# Patient Record
Sex: FEMALE | Race: BLACK OR AFRICAN AMERICAN | NOT HISPANIC OR LATINO | Employment: UNEMPLOYED | URBAN - METROPOLITAN AREA
[De-identification: names, ages, dates, MRNs, and addresses within clinical notes are randomized per-mention and may not be internally consistent; named-entity substitution may affect disease eponyms.]

---

## 2018-07-02 ENCOUNTER — OFFICE VISIT (OUTPATIENT)
Dept: INTERNAL MEDICINE | Facility: MEDICAL CENTER | Age: 28
End: 2018-07-02
Payer: MEDICAID

## 2018-07-02 VITALS
HEIGHT: 66 IN | BODY MASS INDEX: 20.49 KG/M2 | WEIGHT: 127.5 LBS | TEMPERATURE: 98.1 F | HEART RATE: 89 BPM | OXYGEN SATURATION: 98 % | DIASTOLIC BLOOD PRESSURE: 64 MMHG | SYSTOLIC BLOOD PRESSURE: 92 MMHG

## 2018-07-02 DIAGNOSIS — Z34.92 PRENATAL CARE IN SECOND TRIMESTER: ICD-10-CM

## 2018-07-02 DIAGNOSIS — O23.42 UTI (URINARY TRACT INFECTION) DURING PREGNANCY, SECOND TRIMESTER: ICD-10-CM

## 2018-07-02 LAB
APPEARANCE UR: NORMAL
BILIRUB UR STRIP-MCNC: NORMAL MG/DL
COLOR UR AUTO: NORMAL
GLUCOSE UR STRIP.AUTO-MCNC: NEGATIVE MG/DL
KETONES UR STRIP.AUTO-MCNC: NORMAL MG/DL
LEUKOCYTE ESTERASE UR QL STRIP.AUTO: NEGATIVE
NITRITE UR QL STRIP.AUTO: NEGATIVE
PH UR STRIP.AUTO: 6 [PH] (ref 5–8)
PROT UR QL STRIP: NEGATIVE MG/DL
RBC UR QL AUTO: NEGATIVE
SP GR UR STRIP.AUTO: 1.02
UROBILINOGEN UR STRIP-MCNC: NEGATIVE MG/DL

## 2018-07-02 PROCEDURE — 81002 URINALYSIS NONAUTO W/O SCOPE: CPT | Performed by: INTERNAL MEDICINE

## 2018-07-02 PROCEDURE — 99214 OFFICE O/P EST MOD 30 MIN: CPT | Mod: GC | Performed by: INTERNAL MEDICINE

## 2018-07-02 PROCEDURE — 81002 URINALYSIS NONAUTO W/O SCOPE: CPT | Mod: GC | Performed by: INTERNAL MEDICINE

## 2018-07-02 ASSESSMENT — PATIENT HEALTH QUESTIONNAIRE - PHQ9: CLINICAL INTERPRETATION OF PHQ2 SCORE: 0

## 2018-07-02 NOTE — LETTER
[facility]  Morgan Olson M.D.  [address]  [place] [address]  Fax: [phone]   Authorization for Release/Disclosure of   Protected Health Information   Name: KALLI QUIGLEY : 1990 SSN: [id number]   Address: [address] [place] [address] [address]  [place] [address] Phone:    [phone] (home)    I authorize the entity listed below to release/disclose the PHI below to:   [facility]/Morgan Olson M.D. and Morgan Olson M.D.   Provider or Entity Name:[address], [place], [place]   Phone:      Fax:     Reason for request: continuity of care   Information to be released:    [  ] LAST COLONOSCOPY,  including any PATH REPORT and follow-up  [  ] LAST FIT/COLOGUARD RESULT [  ] LAST DEXA  [  ] LAST MAMMOGRAM  [  ] LAST PAP  [  ] LAST LABS [  ] RETINA EXAM REPORT  [ X ] IMMUNIZATION RECORDS  [ X ] Release all info      [  ] Check here and initial the line next to each item to release ALL health information INCLUDING  _____ Care and treatment for drug and / or alcohol abuse  _____ HIV testing, infection status, or AIDS  _____ Genetic Testing    DATES OF SERVICE OR TIME PERIOD TO BE DISCLOSED: _____________  I understand and acknowledge that:  * This Authorization may be revoked at any time by you in writing, except if your health information has already been used or disclosed.  * Your health information that will be used or disclosed as a result of you signing this authorization could be re-disclosed by the recipient. If this occurs, your re-disclosed health information may no longer be protected by State or Federal laws.  * You may refuse to sign this Authorization. Your refusal will not affect your ability to obtain treatment.  * This Authorization becomes effective upon signing and will  on (date) __________.      If no date is indicated, this Authorization will  one (1) year from the signature date.    Name: Kalli  Анна    Signature:   Date:     7/2/2018       PLEASE FAX REQUESTED RECORDS BACK TO: [phone]

## 2018-07-02 NOTE — PROGRESS NOTES
New Patient to Establish    Reason to establish: New patient to establish    CC: Prenatal check up, OB/GYN referral      HPI: 27 yo female came in for prenatal check up and referral to OB/GYN. Pt's LMP was on 2/13/2018. She reports taking over the counter medication, AZO for dysuria about a week ago. Pt saw an OB on 4/10/2018 who confirmed the pregnancy. Pt has not had any lab work or prenatal care. She was moving from [facility]    Patient Active Problem List    Diagnosis Date Noted   • Labor and delivery indication for care or intervention 05/17/2013   • Abnormal Pap smear, low grade squamous intraepithelial lesion (LGSIL) 12/11/2012   • GBS (group B streptococcus) UTI complicating pregnancy 12/03/2012   • Supervision of normal pregnancy 11/27/2012       Past Medical History:   Diagnosis Date   • Abnormal Pap smear, low grade squamous intraepithelial lesion (LGSIL) 12/11/2012       Current Outpatient Prescriptions   Medication Sig Dispense Refill   • Prenatal Vit-Fe Fumarate-FA (PRENATAL PO) Take 1 Tab by mouth every day.     • ondansetron (ZOFRAN ODT) 4 MG TABLET DISPERSIBLE Take 1 Tab by mouth every 8 hours as needed. (Patient not taking: Reported on 7/2/2018) 20 Tab 0   • metronidazole (METROGEL) 0.75 % gel Apply one daily for five days (Patient not taking: Reported on 7/2/2018) 5 Tube 1   • omeprazole (PRILOSEC) 40 MG delayed-release capsule Take 1 Cap by mouth every day. (Patient not taking: Reported on 7/2/2018) 30 Cap 0     No current facility-administered medications for this visit.        Allergies as of 07/02/2018   • (No Known Allergies)       Social History     Social History   • Marital status: Single     Spouse name: N/A   • Number of children: N/A   • Years of education: N/A     Occupational History   • Not on file.     Social History Main Topics   • Smoking status: Never Smoker   • Smokeless tobacco: Never Used   • Alcohol use No      Comment: last used 9/12 due to pregnancy    • Drug use: No  "  • Sexual activity: Yes     Partners: Male      Comment: none     Other Topics Concern   • Not on file     Social History Narrative   • No narrative on file       Family History   Problem Relation Age of Onset   • Hypertension Mother    • Heart Disease Maternal Grandmother    • Alcohol/Drug Maternal Grandfather    • Diabetes Paternal Grandmother        No past surgical history on file.    ROS: As per HPI. Additional pertinent symptoms as noted below.      BP (!) 92/64   Pulse 89   Temp 36.7 °C (98.1 °F)   Ht 1.683 m (5' 6.25\")   Wt 57.8 kg (127 lb 8 oz)   LMP 08/16/2012 Comment: sure  SpO2 98%   BMI 20.42 kg/m²     Physical Exam  General:  Alert and oriented, No apparent distress.    Eyes: Pupils equal and reactive. No scleral icterus.    Throat: Clear no erythema or exudates noted.    Neck: Supple. No lymphadenopathy noted. Thyroid not enlarged.    Lungs: Clear to auscultation and percussion bilaterally.    Cardiovascular: Regular rate and rhythm. No murmurs, rubs or gallops.    Abdomen:  Benign. No rebound or guarding noted.    Extremities: No clubbing, cyanosis, edema.    Skin: Clear. No rash or suspicious skin lesions noted.        Note: I have reviewed all pertinent labs and diagnostic tests associated with this visit with specific comments listed under the assessment and plan below    Assessment and Plan    1. UTI (urinary tract infection) during pregnancy, second trimester  -Dysuria about a week ago. Pt took over the counter AZO  -She has no symptoms,Urinalysis was negative in office.      2. Prenatal care in second trimester  -referred to OB/GYN    3. Hypotension  -BP laying down as 98 systolic and 68 diastolic, standing up 100 systolic and 67 diastolic  -She is asymptomatic.Advised pt to drink more water, and to inform us if she has any symptoms.       Followup: No Follow-up on file.      Signed by: Morgan Olson M.D.  "

## 2018-10-30 LAB — STREP GP B DNA PCR: NEGATIVE

## 2018-11-18 LAB — STREP GP B DNA PCR: NEGATIVE

## 2018-11-26 ENCOUNTER — HOSPITAL ENCOUNTER (OUTPATIENT)
Facility: MEDICAL CENTER | Age: 28
End: 2018-11-26
Attending: SPECIALIST | Admitting: SPECIALIST
Payer: MEDICAID

## 2018-11-26 ENCOUNTER — APPOINTMENT (OUTPATIENT)
Dept: OBGYN | Facility: MEDICAL CENTER | Age: 28
End: 2018-11-26
Attending: SPECIALIST
Payer: MEDICAID

## 2018-11-26 VITALS
DIASTOLIC BLOOD PRESSURE: 75 MMHG | TEMPERATURE: 97.2 F | WEIGHT: 159 LBS | RESPIRATION RATE: 18 BRPM | HEART RATE: 95 BPM | SYSTOLIC BLOOD PRESSURE: 114 MMHG | HEIGHT: 66 IN | BODY MASS INDEX: 25.55 KG/M2

## 2018-11-26 PROCEDURE — 59025 FETAL NON-STRESS TEST: CPT

## 2018-11-26 PROCEDURE — 700101 HCHG RX REV CODE 250: Performed by: SPECIALIST

## 2018-11-26 RX ADMIN — DINOPROSTONE 5 MG: 20 SUPPOSITORY VAGINAL at 22:31

## 2018-11-27 ENCOUNTER — APPOINTMENT (OUTPATIENT)
Dept: OBGYN | Facility: MEDICAL CENTER | Age: 28
End: 2018-11-27
Attending: SPECIALIST
Payer: MEDICAID

## 2018-11-27 ENCOUNTER — HOSPITAL ENCOUNTER (INPATIENT)
Facility: MEDICAL CENTER | Age: 28
LOS: 2 days | End: 2018-11-29
Attending: SPECIALIST | Admitting: SPECIALIST
Payer: MEDICAID

## 2018-11-27 DIAGNOSIS — R10.2 PELVIC PAIN: Primary | ICD-10-CM

## 2018-11-27 LAB
BASOPHILS # BLD AUTO: 0.3 % (ref 0–1.8)
BASOPHILS # BLD: 0.02 K/UL (ref 0–0.12)
EOSINOPHIL # BLD AUTO: 0.04 K/UL (ref 0–0.51)
EOSINOPHIL NFR BLD: 0.5 % (ref 0–6.9)
ERYTHROCYTE [DISTWIDTH] IN BLOOD BY AUTOMATED COUNT: 44.9 FL (ref 35.9–50)
HCT VFR BLD AUTO: 40.4 % (ref 37–47)
HGB BLD-MCNC: 13.2 G/DL (ref 12–16)
HOLDING TUBE BB 8507: NORMAL
IMM GRANULOCYTES # BLD AUTO: 0.08 K/UL (ref 0–0.11)
IMM GRANULOCYTES NFR BLD AUTO: 1.1 % (ref 0–0.9)
LYMPHOCYTES # BLD AUTO: 1.18 K/UL (ref 1–4.8)
LYMPHOCYTES NFR BLD: 15.9 % (ref 22–41)
MCH RBC QN AUTO: 28.7 PG (ref 27–33)
MCHC RBC AUTO-ENTMCNC: 32.7 G/DL (ref 33.6–35)
MCV RBC AUTO: 87.8 FL (ref 81.4–97.8)
MONOCYTES # BLD AUTO: 0.65 K/UL (ref 0–0.85)
MONOCYTES NFR BLD AUTO: 8.7 % (ref 0–13.4)
NEUTROPHILS # BLD AUTO: 5.46 K/UL (ref 2–7.15)
NEUTROPHILS NFR BLD: 73.5 % (ref 44–72)
NRBC # BLD AUTO: 0 K/UL
NRBC BLD-RTO: 0 /100 WBC
PLATELET # BLD AUTO: 174 K/UL (ref 164–446)
PMV BLD AUTO: 11.1 FL (ref 9–12.9)
RBC # BLD AUTO: 4.6 M/UL (ref 4.2–5.4)
RUBV IGM SER IA-ACNC: NORMAL
WBC # BLD AUTO: 7.4 K/UL (ref 4.8–10.8)

## 2018-11-27 PROCEDURE — 59409 OBSTETRICAL CARE: CPT

## 2018-11-27 PROCEDURE — 36415 COLL VENOUS BLD VENIPUNCTURE: CPT

## 2018-11-27 PROCEDURE — 770002 HCHG ROOM/CARE - OB PRIVATE (112)

## 2018-11-27 PROCEDURE — 4A1H74Z MONITORING OF PRODUCTS OF CONCEPTION, CARDIAC ELECTRICAL ACTIVITY, VIA NATURAL OR ARTIFICIAL OPENING: ICD-10-PCS | Performed by: SPECIALIST

## 2018-11-27 PROCEDURE — A9270 NON-COVERED ITEM OR SERVICE: HCPCS | Performed by: SPECIALIST

## 2018-11-27 PROCEDURE — 3E033VJ INTRODUCTION OF OTHER HORMONE INTO PERIPHERAL VEIN, PERCUTANEOUS APPROACH: ICD-10-PCS | Performed by: SPECIALIST

## 2018-11-27 PROCEDURE — 700111 HCHG RX REV CODE 636 W/ 250 OVERRIDE (IP): Performed by: SPECIALIST

## 2018-11-27 PROCEDURE — 10907ZC DRAINAGE OF AMNIOTIC FLUID, THERAPEUTIC FROM PRODUCTS OF CONCEPTION, VIA NATURAL OR ARTIFICIAL OPENING: ICD-10-PCS | Performed by: SPECIALIST

## 2018-11-27 PROCEDURE — 700105 HCHG RX REV CODE 258: Performed by: SPECIALIST

## 2018-11-27 PROCEDURE — 700111 HCHG RX REV CODE 636 W/ 250 OVERRIDE (IP)

## 2018-11-27 PROCEDURE — 304965 HCHG RECOVERY SERVICES

## 2018-11-27 PROCEDURE — 700102 HCHG RX REV CODE 250 W/ 637 OVERRIDE(OP): Performed by: SPECIALIST

## 2018-11-27 PROCEDURE — 85025 COMPLETE CBC W/AUTO DIFF WBC: CPT

## 2018-11-27 PROCEDURE — 700105 HCHG RX REV CODE 258

## 2018-11-27 PROCEDURE — 303615 HCHG EPIDURAL/SPINAL ANESTHESIA FOR LABOR

## 2018-11-27 PROCEDURE — 10H07YZ INSERTION OF OTHER DEVICE INTO PRODUCTS OF CONCEPTION, VIA NATURAL OR ARTIFICIAL OPENING: ICD-10-PCS | Performed by: SPECIALIST

## 2018-11-27 RX ORDER — SODIUM CHLORIDE, SODIUM LACTATE, POTASSIUM CHLORIDE, CALCIUM CHLORIDE 600; 310; 30; 20 MG/100ML; MG/100ML; MG/100ML; MG/100ML
INJECTION, SOLUTION INTRAVENOUS CONTINUOUS
Status: DISCONTINUED | OUTPATIENT
Start: 2018-11-27 | End: 2018-11-27

## 2018-11-27 RX ORDER — IBUPROFEN 600 MG/1
600 TABLET ORAL EVERY 6 HOURS PRN
Status: DISCONTINUED | OUTPATIENT
Start: 2018-11-27 | End: 2018-11-29 | Stop reason: HOSPADM

## 2018-11-27 RX ORDER — ONDANSETRON 4 MG/1
4 TABLET, ORALLY DISINTEGRATING ORAL EVERY 6 HOURS PRN
Status: DISCONTINUED | OUTPATIENT
Start: 2018-11-27 | End: 2018-11-29 | Stop reason: HOSPADM

## 2018-11-27 RX ORDER — OXYCODONE HYDROCHLORIDE AND ACETAMINOPHEN 5; 325 MG/1; MG/1
1-2 TABLET ORAL EVERY 4 HOURS PRN
Status: DISCONTINUED | OUTPATIENT
Start: 2018-11-27 | End: 2018-11-29 | Stop reason: HOSPADM

## 2018-11-27 RX ORDER — METHYLERGONOVINE MALEATE 0.2 MG/ML
0.2 INJECTION INTRAVENOUS
Status: DISCONTINUED | OUTPATIENT
Start: 2018-11-27 | End: 2018-11-29 | Stop reason: HOSPADM

## 2018-11-27 RX ORDER — OXYCODONE HYDROCHLORIDE AND ACETAMINOPHEN 5; 325 MG/1; MG/1
1 TABLET ORAL EVERY 4 HOURS PRN
Status: DISCONTINUED | OUTPATIENT
Start: 2018-11-27 | End: 2018-11-29 | Stop reason: HOSPADM

## 2018-11-27 RX ORDER — ROPIVACAINE HYDROCHLORIDE 2 MG/ML
INJECTION, SOLUTION EPIDURAL; INFILTRATION; PERINEURAL CONTINUOUS
Status: DISCONTINUED | OUTPATIENT
Start: 2018-11-27 | End: 2018-11-27

## 2018-11-27 RX ORDER — SODIUM CHLORIDE, SODIUM LACTATE, POTASSIUM CHLORIDE, AND CALCIUM CHLORIDE .6; .31; .03; .02 G/100ML; G/100ML; G/100ML; G/100ML
1000 INJECTION, SOLUTION INTRAVENOUS
Status: DISCONTINUED | OUTPATIENT
Start: 2018-11-27 | End: 2018-11-27 | Stop reason: HOSPADM

## 2018-11-27 RX ORDER — MISOPROSTOL 200 UG/1
800 TABLET ORAL
Status: DISCONTINUED | OUTPATIENT
Start: 2018-11-27 | End: 2018-11-27 | Stop reason: HOSPADM

## 2018-11-27 RX ORDER — ONDANSETRON 2 MG/ML
4 INJECTION INTRAMUSCULAR; INTRAVENOUS EVERY 6 HOURS PRN
Status: DISCONTINUED | OUTPATIENT
Start: 2018-11-27 | End: 2018-11-29 | Stop reason: HOSPADM

## 2018-11-27 RX ORDER — ROPIVACAINE HYDROCHLORIDE 2 MG/ML
INJECTION, SOLUTION EPIDURAL; INFILTRATION; PERINEURAL
Status: COMPLETED
Start: 2018-11-27 | End: 2018-11-27

## 2018-11-27 RX ORDER — ACETAMINOPHEN 325 MG/1
325 TABLET ORAL EVERY 4 HOURS PRN
Status: DISCONTINUED | OUTPATIENT
Start: 2018-11-27 | End: 2018-11-29 | Stop reason: HOSPADM

## 2018-11-27 RX ORDER — SODIUM CHLORIDE, SODIUM LACTATE, POTASSIUM CHLORIDE, AND CALCIUM CHLORIDE .6; .31; .03; .02 G/100ML; G/100ML; G/100ML; G/100ML
250 INJECTION, SOLUTION INTRAVENOUS PRN
Status: DISCONTINUED | OUTPATIENT
Start: 2018-11-27 | End: 2018-11-27 | Stop reason: HOSPADM

## 2018-11-27 RX ORDER — SODIUM CHLORIDE, SODIUM LACTATE, POTASSIUM CHLORIDE, CALCIUM CHLORIDE 600; 310; 30; 20 MG/100ML; MG/100ML; MG/100ML; MG/100ML
INJECTION, SOLUTION INTRAVENOUS
Status: COMPLETED
Start: 2018-11-27 | End: 2018-11-27

## 2018-11-27 RX ORDER — ALUMINA, MAGNESIA, AND SIMETHICONE 2400; 2400; 240 MG/30ML; MG/30ML; MG/30ML
30 SUSPENSION ORAL EVERY 6 HOURS PRN
Status: DISCONTINUED | OUTPATIENT
Start: 2018-11-27 | End: 2018-11-27 | Stop reason: HOSPADM

## 2018-11-27 RX ORDER — DOCUSATE SODIUM 100 MG/1
100 CAPSULE, LIQUID FILLED ORAL 2 TIMES DAILY PRN
Status: DISCONTINUED | OUTPATIENT
Start: 2018-11-27 | End: 2018-11-29 | Stop reason: HOSPADM

## 2018-11-27 RX ORDER — CARBOPROST TROMETHAMINE 250 UG/ML
250 INJECTION, SOLUTION INTRAMUSCULAR
Status: DISCONTINUED | OUTPATIENT
Start: 2018-11-27 | End: 2018-11-27 | Stop reason: HOSPADM

## 2018-11-27 RX ORDER — METHYLERGONOVINE MALEATE 0.2 MG/ML
0.2 INJECTION INTRAVENOUS
Status: DISCONTINUED | OUTPATIENT
Start: 2018-11-27 | End: 2018-11-27 | Stop reason: HOSPADM

## 2018-11-27 RX ORDER — IBUPROFEN 800 MG/1
800 TABLET ORAL EVERY 8 HOURS PRN
Status: DISCONTINUED | OUTPATIENT
Start: 2018-11-27 | End: 2018-11-29 | Stop reason: HOSPADM

## 2018-11-27 RX ORDER — OXYCODONE HYDROCHLORIDE AND ACETAMINOPHEN 5; 325 MG/1; MG/1
2 TABLET ORAL EVERY 4 HOURS PRN
Status: DISCONTINUED | OUTPATIENT
Start: 2018-11-27 | End: 2018-11-29 | Stop reason: HOSPADM

## 2018-11-27 RX ORDER — DEXTROSE, SODIUM CHLORIDE, SODIUM LACTATE, POTASSIUM CHLORIDE, AND CALCIUM CHLORIDE 5; .6; .31; .03; .02 G/100ML; G/100ML; G/100ML; G/100ML; G/100ML
INJECTION, SOLUTION INTRAVENOUS CONTINUOUS
Status: DISCONTINUED | OUTPATIENT
Start: 2018-11-27 | End: 2018-11-27

## 2018-11-27 RX ORDER — VITAMIN A ACETATE, BETA CAROTENE, ASCORBIC ACID, CHOLECALCIFEROL, .ALPHA.-TOCOPHEROL ACETATE, DL-, THIAMINE MONONITRATE, RIBOFLAVIN, NIACINAMIDE, PYRIDOXINE HYDROCHLORIDE, FOLIC ACID, CYANOCOBALAMIN, CALCIUM CARBONATE, FERROUS FUMARATE, ZINC OXIDE, CUPRIC OXIDE 3080; 12; 120; 400; 1; 1.84; 3; 20; 22; 920; 25; 200; 27; 10; 2 [IU]/1; UG/1; MG/1; [IU]/1; MG/1; MG/1; MG/1; MG/1; MG/1; [IU]/1; MG/1; MG/1; MG/1; MG/1; MG/1
1 TABLET, FILM COATED ORAL EVERY MORNING
Status: DISCONTINUED | OUTPATIENT
Start: 2018-11-28 | End: 2018-11-28

## 2018-11-27 RX ORDER — SODIUM CHLORIDE, SODIUM LACTATE, POTASSIUM CHLORIDE, CALCIUM CHLORIDE 600; 310; 30; 20 MG/100ML; MG/100ML; MG/100ML; MG/100ML
INJECTION, SOLUTION INTRAVENOUS PRN
Status: DISCONTINUED | OUTPATIENT
Start: 2018-11-27 | End: 2018-11-29 | Stop reason: HOSPADM

## 2018-11-27 RX ADMIN — SODIUM CHLORIDE, POTASSIUM CHLORIDE, SODIUM LACTATE AND CALCIUM CHLORIDE 125 ML: 600; 310; 30; 20 INJECTION, SOLUTION INTRAVENOUS at 10:50

## 2018-11-27 RX ADMIN — Medication 2 MILLI-UNITS/MIN: at 10:50

## 2018-11-27 RX ADMIN — IBUPROFEN 800 MG: 800 TABLET, FILM COATED ORAL at 21:40

## 2018-11-27 RX ADMIN — ROPIVACAINE HYDROCHLORIDE 100 ML: 2 INJECTION, SOLUTION EPIDURAL; INFILTRATION at 16:35

## 2018-11-27 RX ADMIN — SODIUM CHLORIDE, SODIUM LACTATE, POTASSIUM CHLORIDE, CALCIUM CHLORIDE AND DEXTROSE MONOHYDRATE: 5; 600; 310; 30; 20 INJECTION, SOLUTION INTRAVENOUS at 19:56

## 2018-11-27 RX ADMIN — Medication 125 ML/HR: at 21:40

## 2018-11-27 RX ADMIN — SODIUM CHLORIDE, POTASSIUM CHLORIDE, SODIUM LACTATE AND CALCIUM CHLORIDE: 600; 310; 30; 20 INJECTION, SOLUTION INTRAVENOUS at 16:53

## 2018-11-27 RX ADMIN — SODIUM CHLORIDE, POTASSIUM CHLORIDE, SODIUM LACTATE AND CALCIUM CHLORIDE: 600; 310; 30; 20 INJECTION, SOLUTION INTRAVENOUS at 16:10

## 2018-11-27 RX ADMIN — Medication 2000 ML/HR: at 20:40

## 2018-11-27 RX ADMIN — SODIUM CHLORIDE, POTASSIUM CHLORIDE, SODIUM LACTATE AND CALCIUM CHLORIDE: 600; 310; 30; 20 INJECTION, SOLUTION INTRAVENOUS at 19:44

## 2018-11-27 ASSESSMENT — PATIENT HEALTH QUESTIONNAIRE - PHQ9
SUM OF ALL RESPONSES TO PHQ9 QUESTIONS 1 AND 2: 0
1. LITTLE INTEREST OR PLEASURE IN DOING THINGS: NOT AT ALL
2. FEELING DOWN, DEPRESSED, IRRITABLE, OR HOPELESS: NOT AT ALL
1. LITTLE INTEREST OR PLEASURE IN DOING THINGS: NOT AT ALL
2. FEELING DOWN, DEPRESSED, IRRITABLE, OR HOPELESS: NOT AT ALL
SUM OF ALL RESPONSES TO PHQ9 QUESTIONS 1 AND 2: 0
1. LITTLE INTEREST OR PLEASURE IN DOING THINGS: NOT AT ALL
SUM OF ALL RESPONSES TO PHQ9 QUESTIONS 1 AND 2: 0
2. FEELING DOWN, DEPRESSED, IRRITABLE, OR HOPELESS: NOT AT ALL

## 2018-11-27 ASSESSMENT — PAIN SCALES - GENERAL: PAINLEVEL_OUTOF10: 0

## 2018-11-27 ASSESSMENT — LIFESTYLE VARIABLES
ALCOHOL_USE: NO
EVER_SMOKED: NEVER

## 2018-11-27 NOTE — PROGRESS NOTES
- pt arrived to unit for OP Gel for PD.   - EFM/TOCO in place. VSS. Reviewed prenatal records. Pt of Dr. Howe, , EDC . Orders placed.   - called Dr. Howe. Orders to d/c pt home once hour is up. Will management pt tomorrow.   - reviewed d/c education reviewed with pt. Questions answered.  - Pt left in care of SO with all her personal possessions.

## 2018-11-27 NOTE — PROGRESS NOTES
0935: Pt arrived to L&D for IOL. EDC 11/20= 41 weeks. Pt ambulated to S216, EFM/TOCO placed, VSS. SVE: 3-4/50/-3.  1010: Admit Orders received from Dr Howe, orders to start pitocin, IV started, labs drawn, admission complete, pitocin started (see MAR).  1300: Dr Howe at bedside, SVE 4/60/-3, unable to AROM at this time. Will come back after office hours to reassess. Plan to keep going with pitocin.  1600: Pt requesting epidural, bolus initiated, Dr Stuart called.  1630: Dr Stuart at bedside for epidural placement  1655: Lechuga placed.  1710: Report given to SHIRLEY Cheatham RN. POC discussed.

## 2018-11-28 LAB
ERYTHROCYTE [DISTWIDTH] IN BLOOD BY AUTOMATED COUNT: 44 FL (ref 35.9–50)
HCT VFR BLD AUTO: 36.1 % (ref 37–47)
HGB BLD-MCNC: 12.3 G/DL (ref 12–16)
MCH RBC QN AUTO: 29.6 PG (ref 27–33)
MCHC RBC AUTO-ENTMCNC: 34.1 G/DL (ref 33.6–35)
MCV RBC AUTO: 86.8 FL (ref 81.4–97.8)
PLATELET # BLD AUTO: 143 K/UL (ref 164–446)
PMV BLD AUTO: 11.1 FL (ref 9–12.9)
RBC # BLD AUTO: 4.16 M/UL (ref 4.2–5.4)
WBC # BLD AUTO: 9 K/UL (ref 4.8–10.8)

## 2018-11-28 PROCEDURE — 770002 HCHG ROOM/CARE - OB PRIVATE (112)

## 2018-11-28 PROCEDURE — 700102 HCHG RX REV CODE 250 W/ 637 OVERRIDE(OP): Performed by: SPECIALIST

## 2018-11-28 PROCEDURE — A9270 NON-COVERED ITEM OR SERVICE: HCPCS | Performed by: SPECIALIST

## 2018-11-28 PROCEDURE — 85027 COMPLETE CBC AUTOMATED: CPT

## 2018-11-28 PROCEDURE — 36415 COLL VENOUS BLD VENIPUNCTURE: CPT

## 2018-11-28 RX ORDER — VITAMIN A ACETATE, BETA CAROTENE, ASCORBIC ACID, CHOLECALCIFEROL, .ALPHA.-TOCOPHEROL ACETATE, DL-, THIAMINE MONONITRATE, RIBOFLAVIN, NIACINAMIDE, PYRIDOXINE HYDROCHLORIDE, FOLIC ACID, CYANOCOBALAMIN, CALCIUM CARBONATE, FERROUS FUMARATE, ZINC OXIDE, CUPRIC OXIDE 3080; 12; 120; 400; 1; 1.84; 3; 20; 22; 920; 25; 200; 27; 10; 2 [IU]/1; UG/1; MG/1; [IU]/1; MG/1; MG/1; MG/1; MG/1; MG/1; [IU]/1; MG/1; MG/1; MG/1; MG/1; MG/1
1 TABLET, FILM COATED ORAL EVERY MORNING
Status: DISCONTINUED | OUTPATIENT
Start: 2018-11-28 | End: 2018-11-29 | Stop reason: HOSPADM

## 2018-11-28 RX ADMIN — OXYCODONE AND ACETAMINOPHEN 1 TABLET: 5; 325 TABLET ORAL at 00:16

## 2018-11-28 ASSESSMENT — EDINBURGH POSTNATAL DEPRESSION SCALE (EPDS)
I HAVE BLAMED MYSELF UNNECESSARILY WHEN THINGS WENT WRONG: NOT VERY OFTEN
I HAVE BEEN SO UNHAPPY THAT I HAVE HAD DIFFICULTY SLEEPING: NOT AT ALL
I HAVE FELT SAD OR MISERABLE: NOT VERY OFTEN
I HAVE BEEN SO UNHAPPY THAT I HAVE BEEN CRYING: NO, NEVER
I HAVE LOOKED FORWARD WITH ENJOYMENT TO THINGS: AS MUCH AS I EVER DID
I HAVE BEEN ANXIOUS OR WORRIED FOR NO GOOD REASON: HARDLY EVER
I HAVE BEEN ABLE TO LAUGH AND SEE THE FUNNY SIDE OF THINGS: AS MUCH AS I ALWAYS COULD
THINGS HAVE BEEN GETTING ON TOP OF ME: NO, I HAVE BEEN COPING AS WELL AS EVER
THE THOUGHT OF HARMING MYSELF HAS OCCURRED TO ME: NEVER
I HAVE FELT SCARED OR PANICKY FOR NO GOOD REASON: NO, NOT AT ALL

## 2018-11-28 ASSESSMENT — PAIN SCALES - GENERAL
PAINLEVEL_OUTOF10: 0
PAINLEVEL_OUTOF10: 0
PAINLEVEL_OUTOF10: 4
PAINLEVEL_OUTOF10: 0

## 2018-11-28 NOTE — L&D DELIVERY NOTE
Normal spontaneous vaginal delivery.   Live term male .   Apgar scores 8 and 8 at one and five minutes respectively.   Weight 3,425 grams.   Over small midline episiotomy.  Under continuous epidural anesthesia.   The placenta was simply delivered and examined and found to be complete.   The small midline episiotomy was repaired with 3-0 chromic in the usual fashion.   Bimanual exam revealed that the uterus was firm and that there were no gauze sponged in the vagina.   The estimated blood loss was about 200 cc's.   Teodoro Howe M.D.

## 2018-11-28 NOTE — PROGRESS NOTES
1710 Report from Odin BHATTI RN, POC discussed, assumed pt care.   1800 FHT decelerations, O2 applied via mask, SVE 7/100/-1 Repositioned to right side. Pitocin off.   1825 Dr. Howe at bedside, AROM clear 8/100/-1. IUPC placed, attempted to place FSE. Not reading, removed by Dr. Howe.   1900 report to Zuleika BHATTI RN.

## 2018-11-28 NOTE — PROGRESS NOTES
The patient is a very pleasant 28-year-old prima para (para 1, with 1 previous vaginal delivery) who is today 41 weeks and 0 days gestation.  She has had her prenatal care with myself during the course of this pregnancy.  She was admitted to Labor and Delivery this morning at about 9:00 this morning for induction of labor for postdates.  She did receive a dose of prostaglandin gel yesterday evening and she returned to labor and delivery this morning at about 9:00 this morning for induction of labor.  On admission to labor and delivery at about 9 or 930 this morning her cervix was found to be about 3-4 cm dilated and 50% effaced and -3 station.  She was started on Pitocin.  Of note her recent vaginal culture for group B strep came back negative for group B strep.  The Pitocin was started in the morning and gradually increased.  At about 1:00 in the afternoon I checked her cervix and found her cervix to be about 4 cm dilated, 60-70 % effaced, and -2--3 station.  The fetal heart tracing at that time was category 1.  She was having uterine contractions and was healing pain with her uterine contractions.  Later in the afternoon she requested a labor epidural and a labor epidural was placed at about 430 this afternoon.  I checked her cervix at about 6:30 PM today and found her cervix to be about 8 cm dilated and 90% effaced and the station was about 0 station to -1 station and I performed artificial rupture at that time and initially clear amniotic fluid was observed.  Later there was a question of a trace of mild meconium staining of the amniotic fluid.  I placed intrauterine pressure catheter and a fetal scalp electrode.  Although the fetal scalp electrode was properly in place on baby's scalp it was not functioning despite changing the cord.  And so the fetal scalp electrode was removed.  However the intrauterine pressure catheter is still in place.  We will anticipate an obstetrical delivery.  Teodoro Howe MD

## 2018-11-28 NOTE — L&D DELIVERY NOTE
DATE OF SERVICE:  11/27/2018    The patient is a very pleasant 28-year-old (on admission, para 1 with 1   previous vaginal delivery) with prenatal care with myself during the course of   this pregnancy and her due date with this pregnancy had been established as   11/20/2018, making her today on the day of admission 41 weeks and 0 days'   gestation.  She was admitted today for induction of labor for postdates.  Her   recent vaginal culture for group B strep came back negative for group B strep.    She received a dose of Prostin gel yesterday evening and was sent home.  She   was admitted to labor and delivery this morning at about 9:00 this morning,   and on admission to labor and delivery, her cervix was found to be about 3-4   cm dilated and 50% effaced and -3 station.  She was started on Pitocin, the   Pitocin was gradually increased.  At about 1:00 in the afternoon, I checked   her cervix and found her cervix at that time to be about 4 cm dilated and   60-70% effaced and -2 to -3 station.  The fetal heart tracing at that time was   found to be category 1.  She was having some uterine contractions and was   feeling some pain with her uterine contractions.  Later in the afternoon, she   requested a labor epidural and received a labor epidural at about 4:30 this   afternoon.  I checked her cervix at about 6:30 p.m. today and found her cervix   to be about 8 cm dilated and 90% effaced, and the station was 0 station to -1   station.  I then at that time performed artificial rupture of membranes and   initially the amniotic fluid appeared clear, and later, there was perhaps some   question of mildly stained (meconium-stained) amniotic fluid.  At the time of   artificial rupture of membranes, I placed an intrauterine pressure catheter   and a scalp electrode.  However, the scalp electrode did not function and was   then removed.  There were periods of fetal bradycardia with recovery and   variability.  Then at about  8:30 in the evening or so, complete cervical   dilation was achieved and she began pushing and went on to have a normal   spontaneous vaginal delivery, at about 8:30 in the evening or so, today,   Tuesday, 2018, at 41 weeks and 0 days' gestation, and she was delivered   of a live term male  with Apgar scores of 8 and 8 at 1 and 5 minutes   respectively and a  weight of 3,425 grams.  Baby was delivered over a small   midline episiotomy under continuous epidural anesthesia.  The placenta was   simply delivered and examined and found to be complete.  Next, examination of   the vulva and vagina revealed no lacerations.  The midline episiotomy was   repaired in layers in the usual fashion with 3-0 chromic.  Next, a bimanual   vaginal exam was performed and revealed that the uterus was firm and that   there were no gauze sponges in the vagina.  Uterus appeared to be firm.    ESTIMATED BLOOD LOSS:  Approximately 200 mL.       ____________________________________     Teodoro Howe MD    MED / [facility]    DD:  2018 21:06:56  DT:  2018 21:59:13    D#:  [id number]  Job#:  [id number]

## 2018-11-28 NOTE — PROGRESS NOTES
Received patient from L&D via wheelchair awake and oriented x3.IV patent and intact.Ushered to Rm.S315.Oriented to staff,room,call light and infant security.Fundus firm,lochia light.Denies pain at this time.Encouraged to call for assistance.Call light within easy reach.Will continue to monitor.

## 2018-11-28 NOTE — CARE PLAN
Problem: Altered physiologic condition related to immediate post-delivery state and potential for bleeding/hemorrhage  Goal: Patient physiologically stable as evidenced by normal lochia, palpable uterine involution and vital signs within normal limits  Outcome: PROGRESSING AS EXPECTED  Fundus firm,lochia light.    Problem: Alteration in comfort related to episiotomy, vaginal repair and/or after birth pains  Goal: Patient verbalizes acceptable pain level  Outcome: PROGRESSING AS EXPECTED  Medicated with percocet 5 mg.for uterine cramps,with good relief as verbalized by patient.Ambulating and voiding well without difficulty.

## 2018-11-28 NOTE — CARE PLAN
Problem: Altered physiologic condition related to immediate post-delivery state and potential for bleeding/hemorrhage  Goal: Patient physiologically stable as evidenced by normal lochia, palpable uterine involution and vital signs within normal limits  Outcome: PROGRESSING AS EXPECTED  Assessment done,fundus firm ,light lochia, Fundal massage done.

## 2018-11-28 NOTE — PROGRESS NOTES
1900 Report received, pt care assumed.    SVE complete, pt pushing   Dr. Howe at bedside    viable male, tight nuchal x 1   Placenta delivered pitocin bolus started   Epidural infusion off.    Bedpan given, +void.   0 Trinh care done. Dermoplast and tucks to perineum. New trinh pad and gown. Pt unable to feel legs, pivoted to wheelchair without difficulty  2250 Pt transferred to room 315 via wheelchair. Pt transferred to PP bed without difficulty. Report to ANDREA Chaparro, PP

## 2018-11-29 VITALS
HEART RATE: 66 BPM | RESPIRATION RATE: 18 BRPM | TEMPERATURE: 97.8 F | HEIGHT: 66 IN | OXYGEN SATURATION: 95 % | SYSTOLIC BLOOD PRESSURE: 105 MMHG | BODY MASS INDEX: 25.55 KG/M2 | WEIGHT: 159 LBS | DIASTOLIC BLOOD PRESSURE: 75 MMHG

## 2018-11-29 PROCEDURE — 700102 HCHG RX REV CODE 250 W/ 637 OVERRIDE(OP): Performed by: SPECIALIST

## 2018-11-29 PROCEDURE — A9270 NON-COVERED ITEM OR SERVICE: HCPCS | Performed by: SPECIALIST

## 2018-11-29 RX ORDER — OXYCODONE HYDROCHLORIDE AND ACETAMINOPHEN 5; 325 MG/1; MG/1
1 TABLET ORAL EVERY 6 HOURS PRN
Qty: 28 TAB | Refills: 0 | Status: SHIPPED | OUTPATIENT
Start: 2018-11-29 | End: 2018-12-06

## 2018-11-29 RX ORDER — IBUPROFEN 800 MG/1
800 TABLET ORAL EVERY 8 HOURS PRN
Qty: 30 TAB | Refills: 0 | Status: SHIPPED | OUTPATIENT
Start: 2018-11-29 | End: 2018-12-06

## 2018-11-29 RX ADMIN — Medication 1 TABLET: at 06:10

## 2018-11-29 ASSESSMENT — PAIN SCALES - GENERAL
PAINLEVEL_OUTOF10: 0

## 2018-11-29 NOTE — PROGRESS NOTES
POST PARTUM DAY # 2  The patient says that she feels fine and that she has no problems or complaints.   The patient's vital signs are stable and she is afebrile.   She appears well developed and well nourished and relaxed and alert and comfortable and in no apparent distress.   Will discharge home today.   Teodoro Howe M.D.

## 2018-11-29 NOTE — PROGRESS NOTES
POST PARTUM DAY # 1  The patient complains of cramping pain.   She says that she feels fine other wise and she says that she has no other problems or complaints.   She says that she would like to say until tomorrow.   The patient's vital signs are stable and she is afebrile.   She appears well developed and well nourished and relaxed and alert and comfortable and in no apparent distress.   Labs: the patient's hemoglobin went from 13.2 grams per deciliter antepartum to 12.8 grams per deciliter post partum.   We will plan on discharge home tomorrow.   Teodoro Howe M.D.

## 2018-11-29 NOTE — CARE PLAN
Problem: Potential for postpartum infection related to presence of episiotomy/vaginal tear and/or uterine contamination  Goal: Patient will be absent from signs and symptoms of infection  Outcome: PROGRESSING AS EXPECTED  Patient shows no s/s of infection. Afebrile.     Problem: Alteration in comfort related to episiotomy, vaginal repair and/or after birth pains  Goal: Patient verbalizes acceptable pain level  Outcome: PROGRESSING AS EXPECTED  Patient states that pain is 0/10. She wishes to call if she desires pain intervention.

## 2018-11-29 NOTE — PROGRESS NOTES
All discharge instructions reviewed with ANDREA Baltazar. Patient given prescriptions,  screening information, lab slip, and all other discharge paperwork. Hospital escort provided. Patient left hospital in stable condition to vehicle.

## 2018-11-29 NOTE — PROGRESS NOTES
Report received from ANDREA Valerio. Assessment completed. All questions answered at this time. Rounding in place.

## 2018-11-29 NOTE — DISCHARGE INSTRUCTIONS
POSTPARTUM DISCHARGE INSTRUCTIONS FOR MOM    YOB: 1990   Age: 28 y.o.               Admit Date: 2018     Discharge Date: 2018  Attending Doctor:  Teodoro Howe M.D.                  Allergies:  Patient has no known allergies.    Discharged to home by car. Discharged via wheelchair, hospital escort: Yes.  Special equipment needed: Not Applicable  Belongings with: Personal  Be sure to schedule a follow-up appointment with your primary care doctor or any specialists as instructed.     Discharge Plan:   Diet Plan: Discussed  Activity Level: Discussed  Confirmed Follow up Appointment: Patient to Call and Schedule Appointment  Confirmed Symptoms Management: Discussed  Medication Reconciliation Updated: Yes  Influenza Vaccine Indication: Patient Refuses    REASONS TO CALL YOUR OBSTETRICIAN:  1.   Persistent fever or shaking chills (Temperature higher than 100.4)  2.   Heavy bleeding (soaking more than 1 pad per hour); Passing clots  3.   Foul odor from vagina  4.   Mastitis (Breast infection; breast pain, chills, fever, redness)  5.   Urinary pain, burning or frequency  6.   Episiotomy infection  7.   Abdominal incision infection  8.   Severe depression longer than 24 hours    HAND WASHING  · Prior to handling the baby.  · Before breastfeeding or bottle feeding baby.  · After using the bathroom or changing the baby's diaper.    WOUND CARE  Ask your physician for additional care instructions.  In general:    ·  Incision:      · Keep clean and dry.    · Do NOT lift anything heavier than your baby for up to 6 weeks.    · There should not be any opening or pus.      VAGINAL CARE  · Nothing inside vagina for 6 weeks: no sexual intercourse, tampons or douching.  · Bleeding may continue for 2-4 weeks.  Amount may vary.    · Call your physician for heavy bleeding which means soaking more than 1 pad per hour    BIRTH CONTROL  · It is possible to become pregnant at any time after delivery and while  "breastfeeding.  · Plan to discuss a method of birth control with your physician at your follow up visit. visit.    DIET AND ELIMINATION  · Eating more fiber (bran cereal, fruits, and vegetables) and drinking plenty of fluids will help to avoid constipation.  · Urinary frequency after childbirth is normal.    POSTPARTUM BLUES  During the first few days after birth, you may experience a sense of the \"blues\" which may include impatience, irritability or even crying.  These feeling come and go quickly.  However, as many as 1 in 10 women experience emotional symptoms known as postpartum depression.    Postpartum depression:  May start as early as the second or third day after delivery or take several weeks or months to develop.  Symptoms of \"blues\" are present, but are more intense:  Crying spells; loss of appetite; feelings of hopelessness or loss of control; fear of touching the baby; over concern or no concern at all about the baby; little or no concern about your own appearance/caring for yourself; and/or inability to sleep or excessive sleeping.  Contact your physician if you are experiencing any of these symptoms.    Crisis Hotline:  · [place] Crisis Hotline:  5-226-ABOELWO  Or 1-371.436.4478  · [place] Crisis Hotline:  1-626.570.9687  Or 454-946-7112    PREVENTING SHAKEN BABY:  If you are angry or stressed, PUT THE BABY IN THE CRIB, step away, take some deep breaths, and wait until you are calm to care for the baby.  DO NOT SHAKE THE BABY.  You are not alone, call a supporter for help.    · Crisis Call Center 24/7 crisis line 324-515-2169 or 1-219.348.3387  · You can also text them, text \"ANSWER\" to 519583    QUIT SMOKING/TOBACCO USE:  I understand the use of any tobacco products increases my chance of suffering from future heart disease and could cause other illnesses which may shorten my life. Quitting the use of tobacco products is the single most important thing I can do to improve my health. For further " information on smoking / tobacco cessation call a Toll Free Quit Line at 1-990.252.4875 (*National Cancer [place]) or 1-794.918.7968 (American Lung Association) or you can access the web based program at www.lungusa.org.    · [place] Tobacco Users Help Line:  (587) 676-8573       Toll Free: 1-342.889.9627  · Quit Tobacco Program [facility] (480)148-1458    DEPRESSION / SUICIDE RISK:  As you are discharged from this [facility], it is important to learn how to keep safe from harming yourself.    Recognize the warning signs:  · Abrupt changes in personality, positive or negative- including increase in energy   · Giving away possessions  · Change in eating patterns- significant weight changes-  positive or negative  · Change in sleeping patterns- unable to sleep or sleeping all the time   · Unwillingness or inability to communicate  · Depression  · Unusual sadness, discouragement and loneliness  · Talk of wanting to die  · Neglect of personal appearance   · Rebelliousness- reckless behavior  · Withdrawal from people/activities they love  · Confusion- inability to concentrate     If you or a loved one observes any of these behaviors or has concerns about self-harm, here's what you can do:  · Talk about it- your feelings and reasons for harming yourself  · Remove any means that you might use to hurt yourself (examples: pills, rope, extension cords, firearm)  · Get professional help from the community (Mental Health, Substance Abuse, psychological counseling)  · Do not be alone:Call your Safe Contact- someone whom you trust who will be there for you.  · Call your local CRISIS HOTLINE 203-2286 or 953-476-9761  · Call your local Children's Mobile Crisis Response Team [place] (292) 886-6628 or www.[place]  · Call the toll free National Suicide Prevention Hotlines   · National Suicide Prevention Lifeline 730-333-TQSK (7134)  · National Hope Line Network 800-SUICIDE  (284-1885)    DISCHARGE SURVEY:  Thank you for choosing [facility].  We hope we provided you with very good care.  You may be receiving a survey in the mail.  Please fill it out.  Your opinion is valuable to us.    ADDITIONAL EDUCATIONAL MATERIALS GIVEN TO PATIENT:        My signature on this form indicates that:  1.  I have reviewed and understand the above information  2.  My questions regarding this information have been answered to my satisfaction.  3.  I have formulated a plan with my discharge nurse to obtain my prescribed medication for home.

## 2019-01-26 ENCOUNTER — HOSPITAL ENCOUNTER (EMERGENCY)
Facility: MEDICAL CENTER | Age: 29
End: 2019-01-27
Attending: EMERGENCY MEDICINE
Payer: MEDICAID

## 2019-01-26 VITALS
HEART RATE: 82 BPM | WEIGHT: 140.43 LBS | BODY MASS INDEX: 22.57 KG/M2 | RESPIRATION RATE: 18 BRPM | SYSTOLIC BLOOD PRESSURE: 110 MMHG | TEMPERATURE: 98.2 F | HEIGHT: 66 IN | OXYGEN SATURATION: 97 % | DIASTOLIC BLOOD PRESSURE: 74 MMHG

## 2019-01-26 DIAGNOSIS — L02.415 ABSCESS OF RIGHT THIGH: ICD-10-CM

## 2019-01-26 PROCEDURE — 99283 EMERGENCY DEPT VISIT LOW MDM: CPT

## 2019-01-26 RX ORDER — CEPHALEXIN 250 MG/1
500 CAPSULE ORAL ONCE
Status: COMPLETED | OUTPATIENT
Start: 2019-01-27 | End: 2019-01-27

## 2019-01-26 RX ORDER — SULFAMETHOXAZOLE AND TRIMETHOPRIM 800; 160 MG/1; MG/1
2 TABLET ORAL ONCE
Status: COMPLETED | OUTPATIENT
Start: 2019-01-27 | End: 2019-01-27

## 2019-01-27 PROCEDURE — 700102 HCHG RX REV CODE 250 W/ 637 OVERRIDE(OP): Performed by: EMERGENCY MEDICINE

## 2019-01-27 PROCEDURE — A9270 NON-COVERED ITEM OR SERVICE: HCPCS | Performed by: EMERGENCY MEDICINE

## 2019-01-27 RX ORDER — SULFAMETHOXAZOLE AND TRIMETHOPRIM 800; 160 MG/1; MG/1
1 TABLET ORAL 2 TIMES DAILY
Qty: 20 TAB | Refills: 0 | Status: SHIPPED | OUTPATIENT
Start: 2019-01-27 | End: 2021-06-07

## 2019-01-27 RX ORDER — CEPHALEXIN 500 MG/1
500 CAPSULE ORAL 4 TIMES DAILY
Qty: 40 CAP | Refills: 0 | Status: SHIPPED | OUTPATIENT
Start: 2019-01-27 | End: 2021-06-07

## 2019-01-27 RX ADMIN — SULFAMETHOXAZOLE AND TRIMETHOPRIM 2 TABLET: 800; 160 TABLET ORAL at 00:10

## 2019-01-27 RX ADMIN — CEPHALEXIN 500 MG: 250 CAPSULE ORAL at 00:10

## 2019-01-27 NOTE — ED NOTES
Pt cleared for d/c  dischg instructions given to pt  Verbally understands  D/c'ed to home in NAD w/ Rx keflex and septra  To fill later this am and take as prescribe  To return immediately for worsening symptoms  Pt verbally understands

## 2019-01-27 NOTE — ED PROVIDER NOTES
"ED Provider Note    Scribed for Markel Price MD by Markel Price. 1/26/2019, 11:59 PM.    Primary care provider: Pcp Pt States None  Means of arrival: Private  History obtained from: Patient  History limited by: None    CHIEF COMPLAINT  Chief Complaint   Patient presents with   • Abscess     r thigh for x 1 week       HPI  Kalli Jj is a 28 y.o. female who presents to the Emergency Department for evaluation of a painful swollen lesion to the anterior right thigh.  No trauma, patient notes this started as a \"pimple\" about a week ago.  Patient notes pain and redness has been worsening, but the lesion began to drain purulent/bloody material yesterday.  No fever, no nausea, no vomiting.  She does similar but more mild symptoms in the past after she had her first child.  No allergies to medications, no history of diabetes mellitus, not anticoagulated.    REVIEW OF SYSTEMS  Pertinent positives include atraumatic pain and swelling with redness and drainage to the anterior right thigh. Pertinent negatives include no fever, no nausea, no vomiting.      PAST MEDICAL HISTORY   has a past medical history of Abnormal Pap smear, low grade squamous intraepithelial lesion (LGSIL) (12/11/2012).    SURGICAL HISTORY  patient denies any surgical history    SOCIAL HISTORY  Social History   Substance Use Topics   • Smoking status: Never Smoker   • Smokeless tobacco: Never Used   • Alcohol use No      Comment: yes      History   Drug Use No       FAMILY HISTORY  Family History   Problem Relation Age of Onset   • Hypertension Mother    • Heart Disease Maternal Grandmother    • Alcohol/Drug Maternal Grandfather    • Diabetes Paternal Grandmother        CURRENT MEDICATIONS  Home Medications     Reviewed by Anna Taylor R.N. (Registered Nurse) on 01/26/19 at 2347  Med List Status: Partial   Medication Last Dose Status   Prenatal Vit-Fe Fumarate-FA (PRENATAL PO) 1/25/2019 Active                ALLERGIES  No " "Known Allergies    PHYSICAL EXAM  VITAL SIGNS: /74   Pulse 82   Temp 36.8 °C (98.2 °F) (Temporal)   Resp 18   Ht 1.676 m (5' 6\")   Wt 63.7 kg (140 lb 6.9 oz)   SpO2 97%   BMI 22.67 kg/m²     General: Alert, no acute distress  Skin: Warm, dry, normal for ethnicity.  2.5 cm lesion consistent with open draining abscess to the midline of the middle third of the anterior right thigh.  8 cm area of surrounding erythema consistent with cellulitis.  Raised, tender, indurated, no palpable fluctuance, no proximal streaking.  Draining mild purulent/bloody fluid  Head: Normocephalic, atraumatic  Neck: Trachea midline, no tenderness  Cardiovascular: Regular rate and rhythm, No murmur, Normal peripheral perfusion  Respiratory: Lungs CTA, respirations are non-labored, breath sounds are equal  Musculoskeletal: No deformity  Neurological: Alert and oriented to person, place, time, and situation  Lymphatics: No lymphadenopathy  Psychiatric: Cooperative, appropriate mood & affect          RADIOLOGY  Bedside ultrasound performed by myself demonstrates indurated tissue consistent with cellulitis, there is scant marbling but no drainable abscess pocket noted in the skin of the anterior right thigh    COURSE & MEDICAL DECISION MAKING  Pertinent Labs & Imaging studies reviewed. (See chart for details)    11:59 PM - Patient seen and examined at bedside. Patient will be treated with Keflex and Bactrim.  Performed a bedside ultrasound to evaluate her symptoms. The differential diagnoses include but are not limited to: Abscess, cellulitis    Patient Vitals for the past 24 hrs:   BP Temp Temp src Pulse Resp SpO2 Height Weight   01/26/19 2339 110/74 36.8 °C (98.2 °F) Temporal 82 18 97 % 1.676 m (5' 6\") 63.7 kg (140 lb 6.9 oz)       Decision Making:  This is a 28 y.o. year old female who presents with atraumatic pain and swelling with redness and drainage to the anterior right thigh.  This is consistent with abscess.  She is nontoxic " with no fever, no tachycardia, no hypotension.  Ultrasound demonstrates no drainable abscess pocket at this time, there is more melena noted.  Given the abscess is open and draining at this point no indication for incision and drainage.  Clearly antibiotics are indicated both for the abscess itself and the surrounding cellulitis.  Recommend warm compress, follow-up with primary care and return if worse or with any concerns.    The patient will return for new or worsening symptoms and is stable at the time of discharge.      DISPOSITION:  Patient will be discharged home in stable condition.    FOLLOW UP:  Yoko Cruz M.D.  [address]  [address]  [facility] [address]  [phone]    Schedule an appointment as soon as possible for a visit in 1 week        OUTPATIENT MEDICATIONS:  Discharge Medication List as of 1/27/2019 12:30 AM      START taking these medications    Details   sulfamethoxazole-trimethoprim (BACTRIM DS) 800-160 MG tablet Take 1 Tab by mouth 2 times a day., Disp-20 Tab, R-0, Print Rx Paper      cephALEXin (KEFLEX) 500 MG Cap Take 1 Cap by mouth 4 times a day., Disp-40 Cap, R-0, Print Rx Paper               FINAL IMPRESSION  1. Abscess of right thigh          Markel CRUZ (Gail), am scribing for, and in the presence of, Markel Price MD.    Electronically signed by: Markle Price (Gail), 1/26/2019    Markel CRUZ MD personally performed the services described in this documentation, as scribed by Markel Price in my presence, and it is both accurate and complete    The note accurately reflects work and decisions made by me.  Markel Price  1/27/2019  1:47 AM

## 2019-01-27 NOTE — ED NOTES
Started as a bump, white in color approx a week.  Started turning red 2 days ago. Painful, now having sharp pain in thigh.

## 2020-02-21 ENCOUNTER — APPOINTMENT (OUTPATIENT)
Dept: RADIOLOGY | Facility: MEDICAL CENTER | Age: 30
End: 2020-02-21
Attending: EMERGENCY MEDICINE
Payer: MEDICAID

## 2020-02-21 ENCOUNTER — HOSPITAL ENCOUNTER (EMERGENCY)
Facility: MEDICAL CENTER | Age: 30
End: 2020-02-21
Attending: EMERGENCY MEDICINE
Payer: MEDICAID

## 2020-02-21 VITALS
WEIGHT: 129.41 LBS | RESPIRATION RATE: 18 BRPM | DIASTOLIC BLOOD PRESSURE: 75 MMHG | TEMPERATURE: 97.9 F | BODY MASS INDEX: 20.8 KG/M2 | OXYGEN SATURATION: 97 % | SYSTOLIC BLOOD PRESSURE: 118 MMHG | HEIGHT: 66 IN | HEART RATE: 92 BPM

## 2020-02-21 DIAGNOSIS — H65.01 RIGHT ACUTE SEROUS OTITIS MEDIA, RECURRENCE NOT SPECIFIED: ICD-10-CM

## 2020-02-21 DIAGNOSIS — J06.9 UPPER RESPIRATORY TRACT INFECTION, UNSPECIFIED TYPE: ICD-10-CM

## 2020-02-21 LAB
FLUAV RNA SPEC QL NAA+PROBE: NEGATIVE
FLUBV RNA SPEC QL NAA+PROBE: NEGATIVE

## 2020-02-21 PROCEDURE — 87502 INFLUENZA DNA AMP PROBE: CPT

## 2020-02-21 PROCEDURE — 71046 X-RAY EXAM CHEST 2 VIEWS: CPT

## 2020-02-21 PROCEDURE — 99284 EMERGENCY DEPT VISIT MOD MDM: CPT

## 2020-02-21 RX ORDER — AMOXICILLIN 500 MG/1
500 CAPSULE ORAL 3 TIMES DAILY
Qty: 30 CAP | Refills: 0 | Status: SHIPPED | OUTPATIENT
Start: 2020-02-21 | End: 2020-03-02

## 2020-02-21 ASSESSMENT — LIFESTYLE VARIABLES
TOTAL SCORE: 0
HAVE PEOPLE ANNOYED YOU BY CRITICIZING YOUR DRINKING: NO
TOTAL SCORE: 0
EVER FELT BAD OR GUILTY ABOUT YOUR DRINKING: NO
TOTAL SCORE: 0
EVER HAD A DRINK FIRST THING IN THE MORNING TO STEADY YOUR NERVES TO GET RID OF A HANGOVER: NO
CONSUMPTION TOTAL: INCOMPLETE
HAVE YOU EVER FELT YOU SHOULD CUT DOWN ON YOUR DRINKING: NO
DO YOU DRINK ALCOHOL: NO

## 2020-02-21 NOTE — DISCHARGE INSTRUCTIONS
Return to the ER for any worsening ear pain, worsening sinus pain/pressure, worsening headache, stiff neck, fevers over 100.4, shaking chills, rash, vomiting, worsening cough, coughing of rust colored phlegm or blood, worsening chest pains or shortness of breath, or for any concerns.    Follow-up with the [facility] clinic within the next 1 to 2 days.  Please call for appointment.    Take over-the-counter cough and cold remedies for your symptoms.    Drink plenty of fluids and get lots of rest.

## 2020-02-21 NOTE — ED TRIAGE NOTES
Pt amb to triage c/o nasal congestion, bi lear pain, headache and cough x1wk. Pt has an infant w same s/s

## 2020-02-21 NOTE — ED PROVIDER NOTES
"ED Provider Note    CHIEF COMPLAINT  Chief Complaint   Patient presents with   • Nasal Congestion   • Headache       HPI  Kalli Jj is a 30 y.o. female who presents complaint of nasal congestion, stuffy nose, cough, right ear pain, and some chest discomfort and shortness of breath as a result of the cough.  Patient states that 1 week ago she came down with runny nose and a cough.  Over the last week the cough is progressively gotten worse.  She also states the nasal congestion and stuffy nose is gotten worse.  She is blowing out some thick yellow nasal discharge.  She feels pain and pressure in her sinuses, particularly when she leans her head forward.  No upper dental pain.  Last night her right ear started hurting.  No drainage from her ear.  She is had a mild waxing and waning headache over the last 2 days.  No stiff neck.  No fevers or chills.  Headache is worse when she coughs.  She states over the last week since she started coughing, she feels a little short of breath and has some chest pain with cough.  She is not coughing up any phlegm or blood.  No chance of pregnancy.  Her children are ill with similar symptoms.  She also had a friend come over who \"had influenza.\"  No muscle aches or body aches.  No rash.  She is not on any medications.  No major medical problems.  She did not get her flu shot this year.  She says she had a few episodes of vomiting right before she came down with the nasal congestion and the cough.  No vomiting or nausea since then.  She has been taking over-the-counter NyQuil and TheraFlu for her symptoms.  She states she just got her insurance yesterday so she does not have a primary care physician.    REVIEW OF SYSTEMS  See HPI for further details.  Positive for cough, nasal congestion, thick green nasal discharge, right ear pain, headache, sinus pain/pressure, chest pain and shortness of breath with cough.  Negative for stiff neck, fever, chills, myalgias, rash, " diarrhea.  All other systems are negative.    PAST MEDICAL HISTORY  Past Medical History:   Diagnosis Date   • Abnormal Pap smear, low grade squamous intraepithelial lesion (LGSIL) 12/11/2012       FAMILY HISTORY  Family History   Problem Relation Age of Onset   • Hypertension Mother    • Heart Disease Maternal Grandmother    • Alcohol/Drug Maternal Grandfather    • Diabetes Paternal Grandmother        SOCIAL HISTORY  Social History     Socioeconomic History   • Marital status: Single     Spouse name: Not on file   • Number of children: Not on file   • Years of education: Not on file   • Highest education level: Not on file   Occupational History   • Not on file   Social Needs   • Financial resource strain: Not on file   • Food insecurity     Worry: Not on file     Inability: Not on file   • Transportation needs     Medical: Not on file     Non-medical: Not on file   Tobacco Use   • Smoking status: Never Smoker   • Smokeless tobacco: Never Used   Substance and Sexual Activity   • Alcohol use: No     Comment: yes   • Drug use: No   • Sexual activity: Yes     Partners: Male     Comment: none   Lifestyle   • Physical activity     Days per week: Not on file     Minutes per session: Not on file   • Stress: Not on file   Relationships   • Social connections     Talks on phone: Not on file     Gets together: Not on file     Attends [employer] service: Not on file     Active member of club or organization: Not on file     Attends meetings of clubs or organizations: Not on file     Relationship status: Not on file   • Intimate partner violence     Fear of current or ex partner: Not on file     Emotionally abused: Not on file     Physically abused: Not on file     Forced sexual activity: Not on file   Other Topics Concern   • Not on file   Social History Narrative   • Not on file       SURGICAL HISTORY  History reviewed. No pertinent surgical history.    CURRENT MEDICATIONS  Home Medications    **Home medications have not yet  "been reviewed for this encounter**         ALLERGIES  No Known Allergies    PHYSICAL EXAM  VITAL SIGNS: /79   Pulse 99   Temp 36.6 °C (97.9 °F) (Temporal)   Resp 16   Ht 1.676 m (5' 6\")   Wt 58.7 kg (129 lb 6.6 oz)   SpO2 97%   BMI 20.89 kg/m²      Constitutional: Well developed, well nourished; No acute distress; Non-toxic appearance.   HENT: Normocephalic, atraumatic; Bilateral external ears normal; Oropharynx with moist mucous membranes; No erythema or exudates in the posterior oropharynx.  Patient sounds stuffy.  She is repeatedly sniffling throughout the exam.  Tenderness to bilateral frontal and maxillary sinuses.  No obvious purulent nasal discharge.  Right TM is dull with a slight serous effusion behind it.  Left TM is clear.  Eyes: PERRL, EOMI, Conjunctiva normal. No discharge.   Neck:  Supple, nontender midline; No stridor; No nuchal rigidity.   Lymphatic: No cervical lymphadenopathy noted.   Cardiovascular: Regular rate and rhythm without murmurs, rubs, or gallop.   Thorax & Lungs: No respiratory distress, breath sounds clear to auscultation bilaterally without wheezing, rales or rhonchi. Nontender chest wall. No crepitus or subcutaneous air  Abdomen: Soft, nontender, bowel sounds normal. No obvious masses; No pulsatile masses; no rebound, guarding, or peritoneal signs.   Skin: Good color; warm and dry without rash or petechia.  Back: Nontender, No CVA tenderness.   Extremities: Distal radial, dorsalis pedis, posterior tibial pulses are equal bilaterally; No edema; Nontender calves or saphenous, No cyanosis, No clubbing.   Musculoskeletal: Good range of motion in all major joints. No tenderness to palpation or major deformities noted.   Neurologic: Alert & oriented x 4, clear speech      RADIOLOGY/PROCEDURES  DX-CHEST-2 VIEWS   Final Result      No radiographic evidence of acute cardiopulmonary process.          COURSE & MEDICAL DECISION MAKING  Pertinent Labs & Imaging studies reviewed. (See " chart for details)  Results for orders placed or performed during the hospital encounter of 02/21/20   Influenza A/B By PCR (Adult - Flu Only)   Result Value Ref Range    Influenza virus A RNA Negative Negative    Influenza virus B, PCR Negative Negative       Patient presents to the ER complaining of cough, runny nose, nasal congestion, thick yellow nasal discharge, some chest discomfort and shortness of breath with cough.  Symptoms began a week ago.  Symptoms began as runny nose, nasal congestion and cough.  Cough progressively got worse.  Patient also complains of some sinus pain and pressure.  The sinus pain pressure gets worse when she leans her head forward.  No fevers or chills.  She has some mild tenderness over frontal and maxillary sinuses.  She sounds very congested.  She is sniffling repeatedly during the exam.  She has some mild headache.  No stiff neck.  No signs of meningismus.  No concern for meningitis at this time.  Last night her right ear started hurting.  She has a serous effusion behind the right ear.  I will send patient home on amoxicillin for otitis media.  She has been told to take over-the-counter cough and cold remedies to help with her symptoms.  Chest x-ray is negative.  No evidence for pneumonia.  Flu test is negative.  Patient's vitals are normal and stable.  She is not any distress.  Her lungs are clear.  At this time I think she is safe and stable for outpatient management discharge home.  She is been given strict return precautions and discharge instructions for upper respiratory infection/serous otitis media and she understands treatment plan and follow-up.      FINAL IMPRESSION  1.  1. Right acute serous otitis media, recurrence not specified Acute    2. Upper respiratory tract infection, unspecified type Acute    .     This dictation has been created using voice recognition software. The accuracy of the dictation is limited by the abilities of the software. I expect there may be  some errors of grammar and possibly content. I made every attempt to manually correct the errors within my dictation. However, errors related to voice recognition software may still exist and should be interpreted within the appropriate context.    Electronically signed by: Adriana Cooper M.D., 2/21/2020 10:32 AM

## 2021-06-07 ENCOUNTER — HOSPITAL ENCOUNTER (EMERGENCY)
Facility: MEDICAL CENTER | Age: 31
End: 2021-06-07
Attending: EMERGENCY MEDICINE
Payer: MEDICAID

## 2021-06-07 VITALS
TEMPERATURE: 97 F | HEIGHT: 66 IN | SYSTOLIC BLOOD PRESSURE: 125 MMHG | OXYGEN SATURATION: 97 % | RESPIRATION RATE: 16 BRPM | WEIGHT: 139.33 LBS | HEART RATE: 82 BPM | BODY MASS INDEX: 22.39 KG/M2 | DIASTOLIC BLOOD PRESSURE: 71 MMHG

## 2021-06-07 DIAGNOSIS — K08.89 PAIN, DENTAL: ICD-10-CM

## 2021-06-07 PROCEDURE — 99282 EMERGENCY DEPT VISIT SF MDM: CPT

## 2021-06-07 RX ORDER — AMOXICILLIN 500 MG/1
500 CAPSULE ORAL 3 TIMES DAILY
Qty: 30 CAPSULE | Refills: 0 | Status: SHIPPED | OUTPATIENT
Start: 2021-06-07 | End: 2021-08-20

## 2021-06-07 RX ORDER — CYCLOBENZAPRINE HCL 10 MG
10 TABLET ORAL 3 TIMES DAILY PRN
Qty: 30 TABLET | Refills: 0 | Status: SHIPPED | OUTPATIENT
Start: 2021-06-07 | End: 2021-08-20

## 2021-06-08 NOTE — DISCHARGE INSTRUCTIONS
Call a dental clinic for follow-up.  Refer to the list we provided you with or call Dr. Roberts at [phone] or [facility] at [phone]    Go to [facility] for fever, facial swelling, increasing dental pain, or other concerns.     Take ibuprofen along with Flexeril and amoxicillin

## 2021-06-08 NOTE — ED PROVIDER NOTES
"ED Provider Note    CHIEF COMPLAINT  Chief Complaint   Patient presents with   • Dental Pain     Left Upper and Lower Dental pain x 2 months   • Headache     reports has been getting more frequent HA r/t pain       HPI  Kalli Joycelyn Jj is a 31 y.o. female who presents complaining of left upper and lower dental pain.    Patient states she saw [facility] 2 months ago and had a cavity repaired.  She states this is when she began having left-sided headaches where she feels her head might explode.  They involve the left temple, left side of the face, and radiate into the left ear.  She does have dental pain as well.  This is a throbbing pain.  She denies fever, chills, nausea, vomiting, visual changes, head trauma, neck pain, photophobia.  No history of migraines although she describes these headaches as migraines.  She has been taking Tylenol and tried combining this with Neurontin without relief.  She states she was taking 3 days of amoxicillin several days ago which seemed to improve her symptoms.  She would like a new dental referral.      ALLERGIES  No Known Allergies    CURRENT MEDICATIONS  Tylenol  Neurontin    PAST MEDICAL HISTORY   has a past medical history of Abnormal Pap smear, low grade squamous intraepithelial lesion (LGSIL) (12/11/2012).    SURGICAL HISTORY  patient denies any surgical history    SOCIAL HISTORY  Social History     Tobacco Use   • Smoking status: Never Smoker   • Smokeless tobacco: Never Used   Substance and Sexual Activity   • Alcohol use: Yes   • Drug use: No   • Sexual activity: Yes     Partners: Male     Comment: none     REVIEW OF SYSTEMS  Patient admits to left-sided dental pain, left-sided headache   pt denies grinding her teeth, fever, chills, facial swelling  See HPI for further details.       PHYSICAL EXAM  VITAL SIGNS: /79   Pulse 86   Temp 36.3 °C (97.3 °F) (Temporal)   Resp 17   Ht 1.676 m (5' 6\")   Wt 63.2 kg (139 lb 5.3 oz)   LMP 06/06/2021   SpO2 97%  "  BMI 22.49 kg/m²     General:  WDWN, nontoxic appearing in NAD; A+Ox3; V/S as above  Skin: warm and dry; good color; no rash  HEENT: NCAT; EOMs intact; PERRL; no scleral icterus; oropharynx clear; no sublingual lesions, no facial swelling, no obvious dental abscess or gingival swelling; left TM clear  Neck: FROM; no meningismus, no LAD  Extremities: RAMOS x 4; no e/o trauma; no pedal edema  Neurologic: CNs III-XII grossly intact; speech clear  Psychiatric: Appropriate affect, normal mood    MEDICAL RECORD  I have reviewed the patient's medical record and pertinent results as listed.      COURSE & MEDICAL DECISION MAKING  I have reviewed any medical record information, laboratory studies and radiographic results as noted.    Appropriate PPE was worn at all times while interacting with the patient, including goggles, N95 mask, and surgical mask.    Kalli Jj is a 31 y.o. female who presents complaining of left upper and lower dental pain for 2 months since a cavity repair.  There is no facial swelling or obvious abscess.  She has no trismus or drooling.  She was provided a dental clinic list, amoxicillin, and recommended to take ibuprofen and Flexeril.  She was given return precautions.  We discussed the possibility of an intracranial process but that the risk for this is quite low, especially given the contemporaneous nature of the onset of her headache on the same side as her dental work.  I am hoping she can get in to see a dentist to evaluate this and, if not improved, she can follow-up with neuroimaging as needed.    FINAL IMPRESSION  1. Pain, dental       Electronically signed by: Marquita Hughes M.D., 6/7/2021 5:58 PM

## 2021-06-08 NOTE — ED TRIAGE NOTES
"Chief Complaint   Patient presents with   • Dental Pain     Left Upper and Lower Dental pain x 2 months   • Headache     reports has been getting more frequent HA r/t pain     /79   Pulse 86   Temp 36.3 °C (97.3 °F) (Temporal)   Resp 17   Ht 1.676 m (5' 6\")   Wt 63.2 kg (139 lb 5.3 oz)   LMP 06/06/2021   SpO2 97%   BMI 22.49 kg/m²       "

## 2021-07-16 ENCOUNTER — TELEPHONE (OUTPATIENT)
Dept: SCHEDULING | Facility: IMAGING CENTER | Age: 31
End: 2021-07-16

## 2021-08-20 ENCOUNTER — OFFICE VISIT (OUTPATIENT)
Dept: MEDICAL GROUP | Facility: MEDICAL CENTER | Age: 31
End: 2021-08-20
Attending: INTERNAL MEDICINE
Payer: MEDICAID

## 2021-08-20 VITALS
HEART RATE: 88 BPM | OXYGEN SATURATION: 99 % | WEIGHT: 134 LBS | SYSTOLIC BLOOD PRESSURE: 118 MMHG | RESPIRATION RATE: 16 BRPM | BODY MASS INDEX: 21.53 KG/M2 | TEMPERATURE: 97.9 F | HEIGHT: 66 IN | DIASTOLIC BLOOD PRESSURE: 78 MMHG

## 2021-08-20 DIAGNOSIS — F33.2 SEVERE EPISODE OF RECURRENT MAJOR DEPRESSIVE DISORDER, WITHOUT PSYCHOTIC FEATURES (HCC): ICD-10-CM

## 2021-08-20 DIAGNOSIS — F51.01 PRIMARY INSOMNIA: ICD-10-CM

## 2021-08-20 PROCEDURE — 99213 OFFICE O/P EST LOW 20 MIN: CPT | Performed by: INTERNAL MEDICINE

## 2021-08-20 PROCEDURE — 99204 OFFICE O/P NEW MOD 45 MIN: CPT | Performed by: INTERNAL MEDICINE

## 2021-08-20 RX ORDER — IBUPROFEN 800 MG/1
TABLET ORAL
COMMUNITY
Start: 2021-06-25 | End: 2021-08-20

## 2021-08-20 RX ORDER — CHLORHEXIDINE GLUCONATE ORAL RINSE 1.2 MG/ML
SOLUTION DENTAL
COMMUNITY
Start: 2021-06-25 | End: 2021-08-20

## 2021-08-20 RX ORDER — CEPHALEXIN 500 MG/1
CAPSULE ORAL
COMMUNITY
Start: 2021-06-16 | End: 2021-08-20

## 2021-08-20 RX ORDER — TRAZODONE HYDROCHLORIDE 50 MG/1
50-100 TABLET ORAL NIGHTLY PRN
Qty: 30 TABLET | Refills: 1 | Status: SHIPPED | OUTPATIENT
Start: 2021-08-20 | End: 2023-11-13

## 2021-08-20 ASSESSMENT — PATIENT HEALTH QUESTIONNAIRE - PHQ9
CLINICAL INTERPRETATION OF PHQ2 SCORE: 6
5. POOR APPETITE OR OVEREATING: 3 - NEARLY EVERY DAY
SUM OF ALL RESPONSES TO PHQ QUESTIONS 1-9: 24

## 2021-08-20 NOTE — ASSESSMENT & PLAN NOTE
She reports struggling with depression for the past year.  States that she has not felt like herself at all.  Reports lack of motivation, severe insomnia, difficulty eating and weight loss, fatigue.  Denies suicidal ideation.  Has never been treated for depression in the past.  Has several family members who suffer from depression but have not taken any medication.  Is in the process of finding a counselor.

## 2021-08-20 NOTE — ASSESSMENT & PLAN NOTE
Reports difficulty with sleep at night.  States that she cannot turn her brain off and it takes her several hours sometimes to fall asleep.  She also reports interrupted sleep quality.  Has tried melatonin over-the-counter without improvement.  Has not tried any other additional medications.

## 2021-08-20 NOTE — PROGRESS NOTES
Kalli Jj is a 31 y.o. female here for depression, insomnia, est care  HPI:  No previous PCP  Severe episode of recurrent major depressive disorder, without psychotic features (HCC)  She reports struggling with depression for the past year.  States that she has not felt like herself at all.  Reports lack of motivation, severe insomnia, difficulty eating and weight loss, fatigue.  Denies suicidal ideation.  Has never been treated for depression in the past.  Has several family members who suffer from depression but have not taken any medication.  Is in the process of finding a counselor.  PHQ 9 in clinic today is 24    Primary insomnia  Reports difficulty with sleep at night.  States that she cannot turn her brain off and it takes her several hours sometimes to fall asleep.  She also reports interrupted sleep quality.  Has tried melatonin over-the-counter without improvement.  Has not tried any other additional medications.    Current medicines (including changes today)  Current Outpatient Medications   Medication Sig Dispense Refill   • sertraline (ZOLOFT) 50 MG Tab Take 1/2 tab daily for 1 week then increase to 1 full tab daily 30 Tablet 1   • traZODone (DESYREL) 50 MG Tab Take 1-2 Tablets by mouth at bedtime as needed for Sleep. 30 Tablet 1   • Multiple Vitamins-Minerals (ZINC PO) Take  by mouth.     • B Complex Vitamins (VITAMIN B COMPLEX PO) Take  by mouth.       No current facility-administered medications for this visit.     She  has a past medical history of Abnormal Pap smear, low grade squamous intraepithelial lesion (LGSIL) (12/11/2012) and Depression.  She  has a past surgical history that includes other.  Social History     Tobacco Use   • Smoking status: Never Smoker   • Smokeless tobacco: Never Used   Vaping Use   • Vaping Use: Never used   Substance Use Topics   • Alcohol use: Yes     Comment: 1 drink per month   • Drug use: No     Social History     Social History Narrative   • Not on  file     Family History   Problem Relation Age of Onset   • Hypertension Mother    • Cancer Mother         cervical   • Diabetes Father    • Hypertension Father    • Alcohol/Drug Maternal Grandfather    • Diabetes Paternal Grandmother          ROS  As above in HPI  All other systems reviewed and are negative     Objective:     Vitals:    08/20/21 1240   BP: 118/78   Pulse: 88   Resp: 16   Temp: 36.6 °C (97.9 °F)   SpO2: 99%     Body mass index is 21.63 kg/m².  Physical Exam:    Constitutional: Alert, no distress.  Skin: Warm, dry, good turgor, no rashes in visible areas.  Eye: Equal, round and reactive, conjunctiva clear, lids normal.  ENMT: Lips without lesions, good dentition, oropharynx clear, TM's clear bilaterally.  Neck: Trachea midline, no masses, no thyromegaly. No cervical or supraclavicular lymphadenopathy.  Respiratory: Unlabored respiratory effort, lungs clear to auscultation, no wheezes, no ronchi.  Cardiovascular: Regular rate and rhythm, no murmurs appreciated, no lower extremity edema.  Abdomen: Soft, non-tender, no masses, no hepatosplenomegaly.  Psych: Alert and oriented x3, depressed mood        Assessment and Plan:   The following treatment plan was discussed    1. Severe episode of recurrent major depressive disorder, without psychotic features (HCC)  Uncontrolled.  We will discuss Zoloft as below.  Referral to therapy also placed in case her current contacts follow through.  Follow-up in 6 weeks  - sertraline (ZOLOFT) 50 MG Tab; Take 1/2 tab daily for 1 week then increase to 1 full tab daily  Dispense: 30 Tablet; Refill: 1  - REFERRAL TO PSYCHOLOGY    2. Primary insomnia  Uncontrolled, did not respond to OTC melatonin.  Trial of trazodone as below  - traZODone (DESYREL) 50 MG Tab; Take 1-2 Tablets by mouth at bedtime as needed for Sleep.  Dispense: 30 Tablet; Refill: 1        Followup: Return in about 6 weeks (around 10/1/2021), or if symptoms worsen or fail to improve, for depression, PAP.

## 2021-09-13 VITALS
OXYGEN SATURATION: 98 % | SYSTOLIC BLOOD PRESSURE: 132 MMHG | TEMPERATURE: 98 F | DIASTOLIC BLOOD PRESSURE: 83 MMHG | RESPIRATION RATE: 16 BRPM | WEIGHT: 128.75 LBS | HEART RATE: 77 BPM | HEIGHT: 66 IN | BODY MASS INDEX: 20.69 KG/M2

## 2021-09-13 LAB
ALBUMIN SERPL BCP-MCNC: 4.7 G/DL (ref 3.2–4.9)
ALBUMIN/GLOB SERPL: 1.7 G/DL
ALP SERPL-CCNC: 45 U/L (ref 30–99)
ALT SERPL-CCNC: 18 U/L (ref 2–50)
ANION GAP SERPL CALC-SCNC: 10 MMOL/L (ref 7–16)
ANISOCYTOSIS BLD QL SMEAR: ABNORMAL
AST SERPL-CCNC: 27 U/L (ref 12–45)
BASOPHILS # BLD AUTO: 0 % (ref 0–1.8)
BASOPHILS # BLD: 0 K/UL (ref 0–0.12)
BILIRUB SERPL-MCNC: 0.2 MG/DL (ref 0.1–1.5)
BUN SERPL-MCNC: 10 MG/DL (ref 8–22)
CALCIUM SERPL-MCNC: 9.1 MG/DL (ref 8.4–10.2)
CHLORIDE SERPL-SCNC: 100 MMOL/L (ref 96–112)
CO2 SERPL-SCNC: 25 MMOL/L (ref 20–33)
CREAT SERPL-MCNC: 0.71 MG/DL (ref 0.5–1.4)
EKG IMPRESSION: NORMAL
EOSINOPHIL # BLD AUTO: 0.08 K/UL (ref 0–0.51)
EOSINOPHIL NFR BLD: 3 % (ref 0–6.9)
ERYTHROCYTE [DISTWIDTH] IN BLOOD BY AUTOMATED COUNT: 44.3 FL (ref 35.9–50)
GLOBULIN SER CALC-MCNC: 2.8 G/DL (ref 1.9–3.5)
GLUCOSE SERPL-MCNC: 87 MG/DL (ref 65–99)
HCT VFR BLD AUTO: 39.4 % (ref 37–47)
HGB BLD-MCNC: 12.5 G/DL (ref 12–16)
LYMPHOCYTES # BLD AUTO: 1.51 K/UL (ref 1–4.8)
LYMPHOCYTES NFR BLD: 58 % (ref 22–41)
MANUAL DIFF BLD: NORMAL
MCH RBC QN AUTO: 26.5 PG (ref 27–33)
MCHC RBC AUTO-ENTMCNC: 31.7 G/DL (ref 33.6–35)
MCV RBC AUTO: 83.5 FL (ref 81.4–97.8)
MICROCYTES BLD QL SMEAR: ABNORMAL
MONOCYTES # BLD AUTO: 0.26 K/UL (ref 0–0.85)
MONOCYTES NFR BLD AUTO: 10 % (ref 0–13.4)
NEUTROPHILS # BLD AUTO: 0.75 K/UL (ref 2–7.15)
NEUTROPHILS NFR BLD: 29 % (ref 44–72)
NRBC # BLD AUTO: 0 K/UL
NRBC BLD-RTO: 0 /100 WBC
OVALOCYTES BLD QL SMEAR: NORMAL
PLATELET # BLD AUTO: 141 K/UL (ref 164–446)
PLATELET BLD QL SMEAR: NORMAL
PMV BLD AUTO: 11.5 FL (ref 9–12.9)
POIKILOCYTOSIS BLD QL SMEAR: NORMAL
POTASSIUM SERPL-SCNC: 3.9 MMOL/L (ref 3.6–5.5)
PROT SERPL-MCNC: 7.5 G/DL (ref 6–8.2)
RBC # BLD AUTO: 4.72 M/UL (ref 4.2–5.4)
RBC BLD AUTO: PRESENT
SODIUM SERPL-SCNC: 135 MMOL/L (ref 135–145)
TROPONIN T SERPL-MCNC: <6 NG/L (ref 6–19)
VARIANT LYMPHS BLD QL SMEAR: NORMAL
WBC # BLD AUTO: 2.6 K/UL (ref 4.8–10.8)

## 2021-09-13 PROCEDURE — 302449 STATCHG TRIAGE ONLY (STATISTIC)

## 2021-09-13 PROCEDURE — 93005 ELECTROCARDIOGRAM TRACING: CPT

## 2021-09-13 PROCEDURE — 85027 COMPLETE CBC AUTOMATED: CPT

## 2021-09-13 PROCEDURE — 80053 COMPREHEN METABOLIC PANEL: CPT

## 2021-09-13 PROCEDURE — 85007 BL SMEAR W/DIFF WBC COUNT: CPT

## 2021-09-13 PROCEDURE — 84484 ASSAY OF TROPONIN QUANT: CPT

## 2021-09-14 ENCOUNTER — HOSPITAL ENCOUNTER (EMERGENCY)
Facility: MEDICAL CENTER | Age: 31
End: 2021-09-14
Payer: MEDICAID

## 2021-09-14 ENCOUNTER — APPOINTMENT (OUTPATIENT)
Dept: RADIOLOGY | Facility: MEDICAL CENTER | Age: 31
End: 2021-09-14
Payer: MEDICAID

## 2021-09-14 NOTE — ED NOTES
Called for pt in lobby 2nd time, no response. Walked entire lobby and no pt remained in lobby at this time.

## 2021-09-14 NOTE — ED TRIAGE NOTES
Pt with positive home test for COVID yesterday; s/s started last week.    Pt c/o chest heaviness, shortness of breath.    Pt did not get vaccine.    Pt is not taking tylenol or ibuprofen, just vitamins.    Alert, oriented, ambulatory.

## 2021-09-19 ENCOUNTER — HOSPITAL ENCOUNTER (EMERGENCY)
Facility: MEDICAL CENTER | Age: 31
End: 2021-09-19
Attending: EMERGENCY MEDICINE | Admitting: EMERGENCY MEDICINE
Payer: MEDICAID

## 2021-09-19 VITALS
HEIGHT: 66 IN | TEMPERATURE: 98 F | RESPIRATION RATE: 16 BRPM | DIASTOLIC BLOOD PRESSURE: 76 MMHG | SYSTOLIC BLOOD PRESSURE: 106 MMHG | WEIGHT: 131.39 LBS | OXYGEN SATURATION: 98 % | HEART RATE: 78 BPM | BODY MASS INDEX: 21.12 KG/M2

## 2021-09-19 DIAGNOSIS — H92.01 RIGHT EAR PAIN: ICD-10-CM

## 2021-09-19 PROCEDURE — 99282 EMERGENCY DEPT VISIT SF MDM: CPT

## 2021-09-19 ASSESSMENT — FIBROSIS 4 INDEX: FIB4 SCORE: 1.44

## 2021-09-20 NOTE — ED PROVIDER NOTES
ED Provider Note    CHIEF COMPLAINT  Chief Complaint   Patient presents with   • Ear Pain     Patient report of right ear pain and ringing in the right ear since yesterday.  No fever.         HPI    Primary care provider: Stephanie Ovalles M.D.   History obtained from: Patient  History limited by: None     Kalli Jj is a 31 y.o. female who presents to the ED complaining of right ear discomfort and ringing since yesterday.  Patient reports that she feels like she needs to put tissue paper in it and hearing is a little bit muffled.  She denies injury or trauma.  There has been no bleeding or drainage.  She denies fever/congestion/sore throat/cough/nausea/vomiting.    REVIEW OF SYSTEMS  Please see HPI for pertinent positives/negatives.     PAST MEDICAL HISTORY  Past Medical History:   Diagnosis Date   • Abnormal Pap smear, low grade squamous intraepithelial lesion (LGSIL) 12/11/2012   • COVID-19    • Depression         SURGICAL HISTORY  Past Surgical History:   Procedure Laterality Date   • OTHER      breast lift        SOCIAL HISTORY  Social History     Tobacco Use   • Smoking status: Never Smoker   • Smokeless tobacco: Never Used   Vaping Use   • Vaping Use: Never used   Substance and Sexual Activity   • Alcohol use: Yes     Comment: 1 drink per month   • Drug use: No   • Sexual activity: Not Currently     Partners: Male        FAMILY HISTORY  Family History   Problem Relation Age of Onset   • Hypertension Mother    • Cancer Mother         cervical   • Diabetes Father    • Hypertension Father    • Alcohol/Drug Maternal Grandfather    • Diabetes Paternal Grandmother         CURRENT MEDICATIONS  Home Medications     Reviewed by Gabrielle Nieto R.N. (Registered Nurse) on 09/19/21 at 2048  Med List Status: Partial   Medication Last Dose Status   Multiple Vitamins-Minerals (ZINC PO) 9/19/2021 Active   sertraline (ZOLOFT) 50 MG Tab  Active   traZODone (DESYREL) 50 MG Tab  Active   VITAMIN D PO 9/19/2021  "Active                 ALLERGIES  No Known Allergies     PHYSICAL EXAM  VITAL SIGNS: /76   Pulse 78   Temp 36.7 °C (98 °F) (Temporal)   Resp 16   Ht 1.676 m (5' 6\")   Wt 59.6 kg (131 lb 6.3 oz)   LMP 09/15/2021   SpO2 98%   BMI 21.21 kg/m²  @DICK[[id number]::@     Pulse ox interpretation: 98% I interpret this pulse ox as normal     Constitutional: Well developed, well nourished, alert in no apparent distress, nontoxic appearance   HENT: No external signs of trauma, normocephalic, EACs and TMs are clear bilaterally, no bleeding or drainage, no perforation noted, no swelling or tenderness over the mastoids  Eyes: No discharge, no icterus   Neck: No stridor, no LAD, nontender to palpation  Cardiovascular: Strong distal pulses and good perfusion   Thorax & Lungs: No respiratory distress   Extremities: No cyanosis, no edema, no gross deformity, good ROM, intact distal pulses with brisk cap refill   Skin: Warm, dry, no pallor/cyanosis, no rash noted   Neuro: A/O times 3, no focal deficits noted, ambulating without difficulty  Psychiatric: Cooperative, normal mood and affect      DIAGNOSTIC STUDIES / PROCEDURES        LABS  All labs reviewed by me.     Results for orders placed or performed during the hospital encounter of 09/14/21   CBC with Differential   Result Value Ref Range    WBC 2.6 (L) 4.8 - 10.8 K/uL    RBC 4.72 4.20 - 5.40 M/uL    Hemoglobin 12.5 12.0 - 16.0 g/dL    Hematocrit 39.4 37.0 - 47.0 %    MCV 83.5 81.4 - 97.8 fL    MCH 26.5 (L) 27.0 - 33.0 pg    MCHC 31.7 (L) 33.6 - 35.0 g/dL    RDW 44.3 35.9 - 50.0 fL    Platelet Count 141 (L) 164 - 446 K/uL    MPV 11.5 9.0 - 12.9 fL    Neutrophils-Polys 29.00 (L) 44.00 - 72.00 %    Lymphocytes 58.00 (H) 22.00 - 41.00 %    Monocytes 10.00 0.00 - 13.40 %    Eosinophils 3.00 0.00 - 6.90 %    Basophils 0.00 0.00 - 1.80 %    Nucleated RBC 0.00 /100 WBC    Neutrophils (Absolute) 0.75 (L) 2.00 - 7.15 K/uL    Lymphs (Absolute) 1.51 1.00 - 4.80 K/uL    Monos " (Absolute) 0.26 0.00 - 0.85 K/uL    Eos (Absolute) 0.08 0.00 - 0.51 K/uL    Baso (Absolute) 0.00 0.00 - 0.12 K/uL    NRBC (Absolute) 0.00 K/uL    Anisocytosis 1+     Microcytosis 1+    Complete Metabolic Panel (CMP)   Result Value Ref Range    Sodium 135 135 - 145 mmol/L    Potassium 3.9 3.6 - 5.5 mmol/L    Chloride 100 96 - 112 mmol/L    Co2 25 20 - 33 mmol/L    Anion Gap 10.0 7.0 - 16.0    Glucose 87 65 - 99 mg/dL    Bun 10 8 - 22 mg/dL    Creatinine 0.71 0.50 - 1.40 mg/dL    Calcium 9.1 8.4 - 10.2 mg/dL    AST(SGOT) 27 12 - 45 U/L    ALT(SGPT) 18 2 - 50 U/L    Alkaline Phosphatase 45 30 - 99 U/L    Total Bilirubin 0.2 0.1 - 1.5 mg/dL    Albumin 4.7 3.2 - 4.9 g/dL    Total Protein 7.5 6.0 - 8.2 g/dL    Globulin 2.8 1.9 - 3.5 g/dL    A-G Ratio 1.7 g/dL   Troponin   Result Value Ref Range    Troponin T <6 6 - 19 ng/L   ESTIMATED GFR   Result Value Ref Range    GFR If African American >60 >60 mL/min/1.73 m 2    GFR If Non African American >60 >60 mL/min/1.73 m 2   DIFFERENTIAL MANUAL   Result Value Ref Range    Manual Diff Status PERFORMED    PLATELET ESTIMATE   Result Value Ref Range    Plt Estimation Normal    MORPHOLOGY   Result Value Ref Range    RBC Morphology Present     Poikilocytosis 1+     Ovalocytes 1+     Reactive Lymphocytes Few    EKG   Result Value Ref Range    Report       [facility]    Test Date:  2021  Pt Name:    KASEY QUIGLEY                Department: [facility]  MRN:        [id number]                      Room:  Gender:     Female                       Technician: YORDAN  :        1990                   Requested By:ER TRIAGE PROTOCOL  Order #:    [id number]                    Reading MD:    Measurements  Intervals                                Axis  Rate:       60                           P:          43  KS:         131                          QRS:        86  QRSD:       97                           T:          68  QT:         420  QTc:         420    Interpretive Statements  Sinus rhythm  No previous ECG available for comparison          RADIOLOGY  The radiologist's interpretation of all radiological studies have been reviewed by me.     No orders to display          COURSE & MEDICAL DECISION MAKING  Nursing notes, VS, PMSFHx reviewed in chart.     Review of past medical records shows the patient was last seen in this ED June 7, 2021 for headache and dental pain.      Differential diagnoses considered include but are not limited to: Otitis media, otitis externa, perforated TM, FB, cerumen impaction, TMJ syndrome      History and physical exam as above.  This is a pleasant well-appearing patient busy playing on her cell phone in no acute distress and nontoxic in appearance.  Her exam is benign and I do not find any concerning acute serious pathology for her right ear symptoms.  She was advised on outpatient follow-up and given return to ED precautions.  Also discussed with patient supportive home care including acetaminophen/ibuprofen as needed.  Patient verbalized understanding and agreed with plan of care with no further questions or concerns.       The patient is referred to a primary physician for blood pressure management, diabetic screening, and for all other preventative health concerns.       FINAL IMPRESSION  1. Right ear pain Acute          DISPOSITION  Patient will be discharged home in stable condition.       FOLLOW UP  Stephanie Ovalles M.D.  [address]  [address]  [facility] [address]  [phone]    Call in 1 day      [facility], Emergency Dept  [address]  [facility] [id number] [phone]    If symptoms worsen         OUTPATIENT MEDICATIONS  Discharge Medication List as of 9/19/2021  9:18 PM             Electronically signed by: Sigifredo Vega D.O., 9/19/2021 9:09 PM      Portions of this record were made with voice recognition software.  Despite my review, spelling/grammar/context errors may still remain.   Interpretation of this chart should be taken in this context.

## 2021-09-20 NOTE — ED NOTES
Patient is stable for discharge at this time, anticipatory guidance provided, close follow-up is encouraged, and ED return instructions have been detailed. Patient is both agreeable to the disposition and plan and discharged home in ambulatory state and in good condition.

## 2021-09-20 NOTE — ED TRIAGE NOTES
"31 yr old female to triage  Chief Complaint   Patient presents with   • Ear Pain     Patient report of right ear pain and ringing in the right ear since yesterday.  No fever.      /75   Pulse 84   Temp 36.6 °C (97.9 °F) (Temporal)   Resp 16   Ht 1.676 m (5' 6\")   Wt 59.6 kg (131 lb 6.3 oz)   LMP 09/15/2021   SpO2 98%   BMI 21.21 kg/m²     Has this patient been vaccinated for COVID No   If not, would they like to be vaccinated while in the ER if eligible?  No   Would the patient like to speak with the ERP about the possibility of receiving the COVID vaccine today before making a decision? No     "

## 2021-10-01 ENCOUNTER — HOSPITAL ENCOUNTER (OUTPATIENT)
Facility: MEDICAL CENTER | Age: 31
End: 2021-10-01
Attending: INTERNAL MEDICINE
Payer: MEDICAID

## 2021-10-01 ENCOUNTER — OFFICE VISIT (OUTPATIENT)
Dept: MEDICAL GROUP | Facility: MEDICAL CENTER | Age: 31
End: 2021-10-01
Attending: INTERNAL MEDICINE
Payer: MEDICAID

## 2021-10-01 VITALS
BODY MASS INDEX: 22.18 KG/M2 | DIASTOLIC BLOOD PRESSURE: 70 MMHG | TEMPERATURE: 97.9 F | HEIGHT: 66 IN | WEIGHT: 138 LBS | SYSTOLIC BLOOD PRESSURE: 100 MMHG | OXYGEN SATURATION: 95 % | RESPIRATION RATE: 16 BRPM | HEART RATE: 84 BPM

## 2021-10-01 DIAGNOSIS — L70.9 ACNE, UNSPECIFIED ACNE TYPE: ICD-10-CM

## 2021-10-01 DIAGNOSIS — F33.2 SEVERE EPISODE OF RECURRENT MAJOR DEPRESSIVE DISORDER, WITHOUT PSYCHOTIC FEATURES (HCC): ICD-10-CM

## 2021-10-01 DIAGNOSIS — N89.8 VAGINAL DISCHARGE: ICD-10-CM

## 2021-10-01 DIAGNOSIS — Z12.4 SCREENING FOR MALIGNANT NEOPLASM OF CERVIX: ICD-10-CM

## 2021-10-01 DIAGNOSIS — F51.01 PRIMARY INSOMNIA: ICD-10-CM

## 2021-10-01 PROCEDURE — 99214 OFFICE O/P EST MOD 30 MIN: CPT | Mod: 25 | Performed by: INTERNAL MEDICINE

## 2021-10-01 PROCEDURE — 87510 GARDNER VAG DNA DIR PROBE: CPT

## 2021-10-01 PROCEDURE — 87624 HPV HI-RISK TYP POOLED RSLT: CPT

## 2021-10-01 PROCEDURE — 88175 CYTOPATH C/V AUTO FLUID REDO: CPT

## 2021-10-01 PROCEDURE — 87480 CANDIDA DNA DIR PROBE: CPT

## 2021-10-01 PROCEDURE — 99213 OFFICE O/P EST LOW 20 MIN: CPT | Mod: 25 | Performed by: INTERNAL MEDICINE

## 2021-10-01 PROCEDURE — 87660 TRICHOMONAS VAGIN DIR PROBE: CPT

## 2021-10-01 RX ORDER — CLINDAMYCIN AND BENZOYL PEROXIDE 10; 50 MG/G; MG/G
GEL TOPICAL
Qty: 35 G | Refills: 0 | Status: SHIPPED | OUTPATIENT
Start: 2021-10-01 | End: 2021-10-03 | Stop reason: SDUPTHER

## 2021-10-01 ASSESSMENT — FIBROSIS 4 INDEX: FIB4 SCORE: 1.44

## 2021-10-01 NOTE — ASSESSMENT & PLAN NOTE
Reports intermittent thick white discharge.  Has been abstinent for 2 years but does have a history of BV in the past.

## 2021-10-01 NOTE — ASSESSMENT & PLAN NOTE
Reports that for the past few years her acne has been worsening.  She notices largest painful cysts on her chin and jawline.  She has been using Cetaphil face wash but she has also tried numerous acne washes over-the-counter.  She has not tried any prescription medication.

## 2021-10-01 NOTE — ASSESSMENT & PLAN NOTE
Reports trying trazodone about 2 nights ago.  Took 1 tablet and reports that it worked a little bit but not fully.  Still having trouble turning her brain off completely at night.  Has not tried 2 tablets.

## 2021-10-01 NOTE — PROGRESS NOTES
Subjective:   Kalli Jj is a 31 y.o. female here today for PAP, acne, f/u depression and insomnia    Acne  Reports that for the past few years her acne has been worsening.  She notices largest painful cysts on her chin and jawline.  She has been using Cetaphil face wash but she has also tried numerous acne washes over-the-counter.  She has not tried any prescription medication.     Severe episode of recurrent major depressive disorder, without psychotic features (HCC)  She has not started taking the Zoloft yet.  She began an herbal supplement OTC and she feels like this is helping a little bit.  She would like to proceed with this method but she is open to trying the Zoloft if her symptoms do not improve.    Primary insomnia  Reports trying trazodone about 2 nights ago.  Took 1 tablet and reports that it worked a little bit but not fully.  Still having trouble turning her brain off completely at night.  Has not tried 2 tablets.    Vaginal discharge  Reports intermittent thick white discharge.  Has been abstinent for 2 years but does have a history of BV in the past.    Screening for malignant neoplasm of cervix  She presents today for Pap.  Her last was done in 2018 and was normal.  She reports the Pap before that was abnormal.  She was pregnant at the time and when they repeated it she states it was normal again.  She reports some vaginal discharge as above.  She is not sexually active.  She denies pelvic pain.  She reports occasional discomfort after urination but denies overt dysuria, increased frequency, hematuria, increased urinary urgency.       Current medicines (including changes today)  Current Outpatient Medications   Medication Sig Dispense Refill   • clindamycin-benzoyl peroxide (BENZACLIN) gel Apply thin layer twice daily as needed to areas of acne on face 35 g 0   • VITAMIN D PO Take 1 Tablet by mouth every day.     • sertraline (ZOLOFT) 50 MG Tab Take 1/2 tab daily for 1 week then  increase to 1 full tab daily 30 Tablet 1   • traZODone (DESYREL) 50 MG Tab Take 1-2 Tablets by mouth at bedtime as needed for Sleep. 30 Tablet 1   • Multiple Vitamins-Minerals (ZINC PO) Take  by mouth.       No current facility-administered medications for this visit.     She  has a past medical history of Abnormal Pap smear, low grade squamous intraepithelial lesion (LGSIL) (12/11/2012), COVID-19, and Depression.    ROS   Denies chest pain, shortness of breath  As above in HPI     Objective:     Vitals:    10/01/21 1311   BP: 100/70   Pulse: 84   Resp: 16   Temp: 36.6 °C (97.9 °F)   SpO2: 95%     Body mass index is 22.27 kg/m².   Physical Exam:  Constitutional: Alert, no distress.  Skin: Warm, dry, good turgor, no rashes in visible areas, several closed comedones/cystic acne over chin and jaw line.  Eye: Equal, round and reactive, conjunctiva clear, lids normal.  Psych: Alert and oriented x3, normal affect and mood.  : external genitalia normal, moderate amount of thick white discharge, no cervical lesions, no cervical motion tenderness      Assessment and Plan:   The following treatment plan was discussed    1. Vaginal discharge  - VAGINAL PATHOGENS DNA PANEL; Future    2. Acne, unspecified acne type  Discussed OTC differin, benzaclin (good rx coupon given)  - clindamycin-benzoyl peroxide (BENZACLIN) gel; Apply thin layer twice daily as needed to areas of acne on face  Dispense: 35 g; Refill: 0    3. Screening for malignant neoplasm of cervix  - THINPREP PAP WITH HPV; Future    4. Primary insomnia  She will try increasing trazodone to 100 mg.  She will reach out to me via [employer] if not effective.  As she is also having some difficulty with her appetite, we discussed trying mirtazapine if the trazodone is ineffective and her depression is not controlled by her herbal supplement    5. Severe episode of recurrent major depressive disorder, without psychotic features (HCC)  She wishes to try an over-the-counter  herbal supplement for now.  She has not started the Zoloft.  See discussion above.        Followup: Return if symptoms worsen or fail to improve.

## 2021-10-01 NOTE — ASSESSMENT & PLAN NOTE
She has not started taking the Zoloft yet.  She began an herbal supplement OTC and she feels like this is helping a little bit.  She would like to proceed with this method but she is open to trying the Zoloft if her symptoms do not improve.

## 2021-10-01 NOTE — ASSESSMENT & PLAN NOTE
She presents today for Pap.  Her last was done in 2018 and was normal.  She reports the Pap before that was abnormal.  She was pregnant at the time and when they repeated it she states it was normal again.  She reports some vaginal discharge as above.  She is not sexually active.  She denies pelvic pain.  She reports occasional discomfort after urination but denies overt dysuria, increased frequency, hematuria, increased urinary urgency.

## 2021-10-02 LAB
CANDIDA DNA VAG QL PROBE+SIG AMP: NEGATIVE
G VAGINALIS DNA VAG QL PROBE+SIG AMP: NEGATIVE
T VAGINALIS DNA VAG QL PROBE+SIG AMP: NEGATIVE

## 2021-10-03 DIAGNOSIS — L70.9 ACNE, UNSPECIFIED ACNE TYPE: ICD-10-CM

## 2021-10-04 DIAGNOSIS — Z12.4 SCREENING FOR MALIGNANT NEOPLASM OF CERVIX: ICD-10-CM

## 2021-10-05 RX ORDER — CLINDAMYCIN AND BENZOYL PEROXIDE 10; 50 MG/G; MG/G
GEL TOPICAL
Qty: 35 G | Refills: 3 | Status: SHIPPED | OUTPATIENT
Start: 2021-10-05 | End: 2022-08-24

## 2021-10-06 LAB
CYTOLOGY REG CYTOL: NORMAL
HPV HR 12 DNA CVX QL NAA+PROBE: NEGATIVE
HPV16 DNA SPEC QL NAA+PROBE: NEGATIVE
HPV18 DNA SPEC QL NAA+PROBE: NEGATIVE
SPECIMEN SOURCE: NORMAL

## 2022-03-30 ENCOUNTER — HOSPITAL ENCOUNTER (EMERGENCY)
Facility: MEDICAL CENTER | Age: 32
End: 2022-03-30
Payer: MEDICAID

## 2022-03-30 ENCOUNTER — HOSPITAL ENCOUNTER (EMERGENCY)
Facility: MEDICAL CENTER | Age: 32
End: 2022-03-30
Attending: EMERGENCY MEDICINE
Payer: MEDICAID

## 2022-03-30 VITALS
TEMPERATURE: 96.6 F | DIASTOLIC BLOOD PRESSURE: 74 MMHG | HEIGHT: 66 IN | WEIGHT: 136.47 LBS | BODY MASS INDEX: 21.93 KG/M2 | HEART RATE: 71 BPM | SYSTOLIC BLOOD PRESSURE: 115 MMHG | RESPIRATION RATE: 16 BRPM | OXYGEN SATURATION: 99 %

## 2022-03-30 VITALS
HEART RATE: 76 BPM | SYSTOLIC BLOOD PRESSURE: 126 MMHG | DIASTOLIC BLOOD PRESSURE: 82 MMHG | OXYGEN SATURATION: 94 % | TEMPERATURE: 98.8 F | RESPIRATION RATE: 16 BRPM

## 2022-03-30 DIAGNOSIS — S01.511A LIP LACERATION, INITIAL ENCOUNTER: ICD-10-CM

## 2022-03-30 PROCEDURE — 304217 HCHG IRRIGATION SYSTEM

## 2022-03-30 PROCEDURE — 302449 STATCHG TRIAGE ONLY (STATISTIC)

## 2022-03-30 PROCEDURE — 304999 HCHG REPAIR-SIMPLE/INTERMED LEVEL 1

## 2022-03-30 PROCEDURE — 99282 EMERGENCY DEPT VISIT SF MDM: CPT

## 2022-03-30 PROCEDURE — 303747 HCHG EXTRA SUTURE

## 2022-03-30 PROCEDURE — 700101 HCHG RX REV CODE 250: Performed by: EMERGENCY MEDICINE

## 2022-03-30 RX ORDER — LIDOCAINE HYDROCHLORIDE 20 MG/ML
10 INJECTION, SOLUTION INFILTRATION; PERINEURAL ONCE
Status: COMPLETED | OUTPATIENT
Start: 2022-03-30 | End: 2022-03-30

## 2022-03-30 RX ADMIN — LIDOCAINE HYDROCHLORIDE 10 ML: 20 INJECTION, SOLUTION INFILTRATION; PERINEURAL at 17:15

## 2022-03-30 ASSESSMENT — FIBROSIS 4 INDEX: FIB4 SCORE: 1.49
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
## 2022-03-31 NOTE — ED NOTES
1738 Pt now states she had a TDAP in a private OB/GYN office 8/2018 when she had a baby Consequently pt refused TDAP here  VSS Pain free due to lidocaine  D/C instn reviewed To do salt water gargles after eating Return for S/S infection and reviewed with pt D/C ambul Stable improved condn

## 2022-05-25 ENCOUNTER — OFFICE VISIT (OUTPATIENT)
Dept: MEDICAL GROUP | Facility: MEDICAL CENTER | Age: 32
End: 2022-05-25
Attending: INTERNAL MEDICINE
Payer: MEDICAID

## 2022-05-25 ENCOUNTER — HOSPITAL ENCOUNTER (OUTPATIENT)
Facility: MEDICAL CENTER | Age: 32
End: 2022-05-25
Attending: INTERNAL MEDICINE
Payer: MEDICAID

## 2022-05-25 VITALS
OXYGEN SATURATION: 98 % | WEIGHT: 140 LBS | HEART RATE: 94 BPM | BODY MASS INDEX: 22.5 KG/M2 | RESPIRATION RATE: 16 BRPM | TEMPERATURE: 97.9 F | HEIGHT: 66 IN | SYSTOLIC BLOOD PRESSURE: 110 MMHG | DIASTOLIC BLOOD PRESSURE: 70 MMHG

## 2022-05-25 DIAGNOSIS — N89.8 VAGINAL DISCHARGE: ICD-10-CM

## 2022-05-25 DIAGNOSIS — Z12.4 SCREENING FOR MALIGNANT NEOPLASM OF CERVIX: ICD-10-CM

## 2022-05-25 PROCEDURE — 87660 TRICHOMONAS VAGIN DIR PROBE: CPT

## 2022-05-25 PROCEDURE — 99213 OFFICE O/P EST LOW 20 MIN: CPT | Mod: 25 | Performed by: INTERNAL MEDICINE

## 2022-05-25 PROCEDURE — 87624 HPV HI-RISK TYP POOLED RSLT: CPT

## 2022-05-25 PROCEDURE — 87591 N.GONORRHOEAE DNA AMP PROB: CPT

## 2022-05-25 PROCEDURE — 87491 CHLMYD TRACH DNA AMP PROBE: CPT

## 2022-05-25 PROCEDURE — 88175 CYTOPATH C/V AUTO FLUID REDO: CPT

## 2022-05-25 PROCEDURE — 87480 CANDIDA DNA DIR PROBE: CPT

## 2022-05-25 PROCEDURE — 87510 GARDNER VAG DNA DIR PROBE: CPT

## 2022-05-25 ASSESSMENT — PATIENT HEALTH QUESTIONNAIRE - PHQ9: CLINICAL INTERPRETATION OF PHQ2 SCORE: 0

## 2022-05-25 ASSESSMENT — FIBROSIS 4 INDEX: FIB4 SCORE: 1.49

## 2022-05-25 NOTE — ASSESSMENT & PLAN NOTE
She reports about 2 weeks ago developing increased vaginal discharge.  She states that she had sex with an old partner prior to this and used a condom.  Partner is asymptomatic.  She has noticed some mild dysuria.

## 2022-05-26 DIAGNOSIS — Z12.4 SCREENING FOR MALIGNANT NEOPLASM OF CERVIX: ICD-10-CM

## 2022-05-26 LAB
C TRACH DNA GENITAL QL NAA+PROBE: NEGATIVE
CANDIDA DNA VAG QL PROBE+SIG AMP: NEGATIVE
CYTOLOGY REG CYTOL: NORMAL
G VAGINALIS DNA VAG QL PROBE+SIG AMP: NEGATIVE
HPV HR 12 DNA CVX QL NAA+PROBE: NEGATIVE
HPV16 DNA SPEC QL NAA+PROBE: NEGATIVE
HPV18 DNA SPEC QL NAA+PROBE: NEGATIVE
N GONORRHOEA DNA GENITAL QL NAA+PROBE: NEGATIVE
SPECIMEN SOURCE: NORMAL
SPECIMEN SOURCE: NORMAL
T VAGINALIS DNA VAG QL PROBE+SIG AMP: NEGATIVE

## 2022-06-28 ENCOUNTER — PATIENT MESSAGE (OUTPATIENT)
Dept: MEDICAL GROUP | Facility: MEDICAL CENTER | Age: 32
End: 2022-06-28
Payer: MEDICAID

## 2022-07-22 ENCOUNTER — PATIENT MESSAGE (OUTPATIENT)
Dept: MEDICAL GROUP | Facility: MEDICAL CENTER | Age: 32
End: 2022-07-22
Payer: MEDICAID

## 2022-07-22 DIAGNOSIS — N89.8 VAGINAL DISCHARGE: ICD-10-CM

## 2022-07-22 DIAGNOSIS — L98.9 PERINEAL IRRITATION IN FEMALE: ICD-10-CM

## 2022-07-22 DIAGNOSIS — N94.9 VAGINAL DISCOMFORT: ICD-10-CM

## 2022-07-22 RX ORDER — FLUCONAZOLE 150 MG/1
150 TABLET ORAL ONCE
Qty: 1 TABLET | Refills: 0 | Status: SHIPPED | OUTPATIENT
Start: 2022-07-22 | End: 2022-07-22

## 2022-07-27 RX ORDER — METRONIDAZOLE 500 MG/1
TABLET ORAL
COMMUNITY
Start: 2022-07-17 | End: 2022-08-24

## 2022-07-27 RX ORDER — FLUCONAZOLE 150 MG/1
TABLET ORAL
COMMUNITY
Start: 2022-07-22 | End: 2022-07-29 | Stop reason: SDUPTHER

## 2022-07-28 ENCOUNTER — HOSPITAL ENCOUNTER (EMERGENCY)
Facility: MEDICAL CENTER | Age: 32
End: 2022-07-28
Attending: EMERGENCY MEDICINE
Payer: MEDICAID

## 2022-07-28 VITALS
HEART RATE: 71 BPM | DIASTOLIC BLOOD PRESSURE: 77 MMHG | TEMPERATURE: 98 F | WEIGHT: 142.64 LBS | RESPIRATION RATE: 16 BRPM | SYSTOLIC BLOOD PRESSURE: 118 MMHG | OXYGEN SATURATION: 100 % | BODY MASS INDEX: 22.92 KG/M2 | HEIGHT: 66 IN

## 2022-07-28 DIAGNOSIS — N90.89 VULVAR IRRITATION: ICD-10-CM

## 2022-07-28 LAB
ALBUMIN SERPL BCP-MCNC: 4.7 G/DL (ref 3.2–4.9)
ALBUMIN/GLOB SERPL: 1.3 G/DL
ALP SERPL-CCNC: 44 U/L (ref 30–99)
ALT SERPL-CCNC: 17 U/L (ref 2–50)
ANION GAP SERPL CALC-SCNC: 15 MMOL/L (ref 7–16)
APPEARANCE UR: CLEAR
AST SERPL-CCNC: 26 U/L (ref 12–45)
BACTERIA GENITAL QL WET PREP: NORMAL
BASOPHILS # BLD AUTO: 0.3 % (ref 0–1.8)
BASOPHILS # BLD: 0.02 K/UL (ref 0–0.12)
BILIRUB SERPL-MCNC: 0.5 MG/DL (ref 0.1–1.5)
BILIRUB UR QL STRIP.AUTO: NEGATIVE
BUN SERPL-MCNC: 11 MG/DL (ref 8–22)
CALCIUM SERPL-MCNC: 9.5 MG/DL (ref 8.4–10.2)
CHLORIDE SERPL-SCNC: 102 MMOL/L (ref 96–112)
CO2 SERPL-SCNC: 20 MMOL/L (ref 20–33)
COLOR UR: YELLOW
CREAT SERPL-MCNC: 0.76 MG/DL (ref 0.5–1.4)
EOSINOPHIL # BLD AUTO: 0.03 K/UL (ref 0–0.51)
EOSINOPHIL NFR BLD: 0.4 % (ref 0–6.9)
ERYTHROCYTE [DISTWIDTH] IN BLOOD BY AUTOMATED COUNT: 55.2 FL (ref 35.9–50)
GFR SERPLBLD CREATININE-BSD FMLA CKD-EPI: 106 ML/MIN/1.73 M 2
GLOBULIN SER CALC-MCNC: 3.7 G/DL (ref 1.9–3.5)
GLUCOSE SERPL-MCNC: 88 MG/DL (ref 65–99)
GLUCOSE UR STRIP.AUTO-MCNC: NEGATIVE MG/DL
HCG SERPL QL: NEGATIVE
HCT VFR BLD AUTO: 38.4 % (ref 37–47)
HGB BLD-MCNC: 11.9 G/DL (ref 12–16)
IMM GRANULOCYTES # BLD AUTO: 0.03 K/UL (ref 0–0.11)
IMM GRANULOCYTES NFR BLD AUTO: 0.4 % (ref 0–0.9)
KETONES UR STRIP.AUTO-MCNC: NEGATIVE MG/DL
LEUKOCYTE ESTERASE UR QL STRIP.AUTO: NEGATIVE
LIPASE SERPL-CCNC: 35 U/L (ref 7–58)
LYMPHOCYTES # BLD AUTO: 2.03 K/UL (ref 1–4.8)
LYMPHOCYTES NFR BLD: 27.2 % (ref 22–41)
MCH RBC QN AUTO: 23.7 PG (ref 27–33)
MCHC RBC AUTO-ENTMCNC: 31 G/DL (ref 33.6–35)
MCV RBC AUTO: 76.5 FL (ref 81.4–97.8)
MICRO URNS: NORMAL
MONOCYTES # BLD AUTO: 0.5 K/UL (ref 0–0.85)
MONOCYTES NFR BLD AUTO: 6.7 % (ref 0–13.4)
NEUTROPHILS # BLD AUTO: 4.85 K/UL (ref 2–7.15)
NEUTROPHILS NFR BLD: 65 % (ref 44–72)
NITRITE UR QL STRIP.AUTO: NEGATIVE
NRBC # BLD AUTO: 0 K/UL
NRBC BLD-RTO: 0 /100 WBC
PH UR STRIP.AUTO: 5.5 [PH] (ref 5–8)
PLATELET # BLD AUTO: 273 K/UL (ref 164–446)
PMV BLD AUTO: 12.4 FL (ref 9–12.9)
POTASSIUM SERPL-SCNC: 3.5 MMOL/L (ref 3.6–5.5)
PROT SERPL-MCNC: 8.4 G/DL (ref 6–8.2)
PROT UR QL STRIP: NEGATIVE MG/DL
RBC # BLD AUTO: 5.02 M/UL (ref 4.2–5.4)
RBC UR QL AUTO: NEGATIVE
SIGNIFICANT IND 70042: NORMAL
SITE SITE: NORMAL
SODIUM SERPL-SCNC: 137 MMOL/L (ref 135–145)
SOURCE SOURCE: NORMAL
SP GR UR STRIP.AUTO: 1.02
WBC # BLD AUTO: 7.5 K/UL (ref 4.8–10.8)

## 2022-07-28 PROCEDURE — 80053 COMPREHEN METABOLIC PANEL: CPT

## 2022-07-28 PROCEDURE — 83690 ASSAY OF LIPASE: CPT

## 2022-07-28 PROCEDURE — 99284 EMERGENCY DEPT VISIT MOD MDM: CPT

## 2022-07-28 PROCEDURE — 85025 COMPLETE CBC W/AUTO DIFF WBC: CPT

## 2022-07-28 PROCEDURE — 84703 CHORIONIC GONADOTROPIN ASSAY: CPT

## 2022-07-28 PROCEDURE — 81003 URINALYSIS AUTO W/O SCOPE: CPT

## 2022-07-28 PROCEDURE — 36415 COLL VENOUS BLD VENIPUNCTURE: CPT

## 2022-07-28 PROCEDURE — 87491 CHLMYD TRACH DNA AMP PROBE: CPT

## 2022-07-28 PROCEDURE — 87591 N.GONORRHOEAE DNA AMP PROB: CPT

## 2022-07-28 ASSESSMENT — FIBROSIS 4 INDEX: FIB4 SCORE: 1.49

## 2022-07-28 NOTE — ED TRIAGE NOTES
"Chief Complaint   Patient presents with   • Painful Urination     X 2 months    • Abdominal Pain     X 2months  Generalized lower abd pain      /72   Pulse 90   Temp 37 °C (98.6 °F) (Temporal)   Resp 16   Ht 1.676 m (5' 6\")   Wt 64.7 kg (142 lb 10.2 oz)   LMP 06/29/2022 (Approximate)   SpO2 98%   BMI 23.02 kg/m²     "

## 2022-07-29 LAB
C TRACH DNA GENITAL QL NAA+PROBE: NEGATIVE
N GONORRHOEA DNA GENITAL QL NAA+PROBE: NEGATIVE
SPECIMEN SOURCE: NORMAL

## 2022-07-29 RX ORDER — FLUCONAZOLE 150 MG/1
150 TABLET ORAL DAILY
Qty: 1 TABLET | Refills: 0 | Status: SHIPPED | OUTPATIENT
Start: 2022-07-29 | End: 2022-08-24

## 2022-07-29 NOTE — ED NOTES
Lab called to find out status on results that are still outstanding.   Wet prep incorrectly sent to [facility] and can not be run.  Must be recollected.  G&C sent to [facility] at 1740 per lab employee.

## 2022-07-29 NOTE — ED PROVIDER NOTES
ED Provider Note      Means of Arrival: Private vehicle  History obtained from: Patient, medical record    CHIEF COMPLAINT  Chief Complaint   Patient presents with   • Painful Urination     X 2 months    • Abdominal Pain     X 2months  Generalized lower abd pain        HPI  Kalli Jj is a 32 y.o. female who presents with: Vaginal irritation and discomfort.  She reports she has been having a burning sensation for several weeks.  Her doctor prescribed her Diflucan without any benefit.  She did recently notice a little bit of discharge around her urethra.  She denies any abdominal pain in her abdomen.  She denies any hematuria but does report dark urine.  She denies any flank pain, fevers, vomiting, diarrhea.  Her irritation is worse with urination as well as occasionally with showering.    REVIEW OF SYSTEMS  CONSTITUTIONAL:  No fever.  CARDIOVASCULAR:  No chest discomfort.  RESPIRATORY:  No pleuritic chest pain.  GASTROINTESTINAL:   See HPI  GENITOURINARY:    See HPI  MUSCULOSKELETAL:  No arthralgia.  SKIN:  No rash or suspicious lesions.  NEUROLOGIC:   No headache.    See HPI for further details.   All other systems are negative.     PAST MEDICAL HISTORY  Past Medical History:   Diagnosis Date   • Abnormal Pap smear, low grade squamous intraepithelial lesion (LGSIL) 12/11/2012   • COVID-19    • Depression        FAMILY HISTORY  Family History   Problem Relation Age of Onset   • Hypertension Mother    • Cancer Mother         cervical   • Diabetes Father    • Hypertension Father    • Alcohol/Drug Maternal Grandfather    • Diabetes Paternal Grandmother        SOCIAL HISTORY   reports that she has never smoked. She has never used smokeless tobacco. She reports current alcohol use. She reports that she does not use drugs.    SURGICAL HISTORY  Past Surgical History:   Procedure Laterality Date   • OTHER      breast lift       CURRENT MEDICATIONS  Home Medications     Reviewed by Rafia Lopez R.N.  "(Registered Nurse) on 07/28/22 at 1607  Med List Status: Not Addressed   Medication Last Dose Status   clindamycin-benzoyl peroxide (BENZACLIN) gel  Active   fluconazole (DIFLUCAN) 150 MG tablet  Active   metroNIDAZOLE (FLAGYL) 500 MG Tab  Active   Multiple Vitamins-Minerals (ZINC PO)  Active   sertraline (ZOLOFT) 50 MG Tab  Active   traZODone (DESYREL) 50 MG Tab  Active   VITAMIN D PO  Active                ALLERGIES  No Known Allergies    PHYSICAL EXAM  VITAL SIGNS: /77   Pulse 71   Temp 36.7 °C (98 °F) (Temporal)   Resp 16   Ht 1.676 m (5' 6\")   Wt 64.7 kg (142 lb 10.2 oz)   LMP 06/29/2022 (Approximate)   SpO2 100%   BMI 23.02 kg/m²    Gen: Alert  HENT: ATNC  Eyes: Normal conjunctiva  Neck: trachea midline  Resp: no respiratory distress  CV: No JVD, RRR  Abd: non-distended, soft, nontender   : No CVA tenderness  Ext: No deformities  Psych: normal mood  Neuro: speech fluent     LABS  Labs Reviewed   CBC WITH DIFFERENTIAL - Abnormal; Notable for the following components:       Result Value    Hemoglobin 11.9 (*)     MCV 76.5 (*)     MCH 23.7 (*)     MCHC 31.0 (*)     RDW 55.2 (*)     All other components within normal limits   COMP METABOLIC PANEL - Abnormal; Notable for the following components:    Potassium 3.5 (*)     Total Protein 8.4 (*)     Globulin 3.7 (*)     All other components within normal limits   LIPASE   URINALYSIS,CULTURE IF INDICATED    Narrative:     Indication for culture:->Patient WITHOUT an indwelling Lechuga  catheter in place with new onset of Dysuria, Frequency,  Urgency, and/or Suprapubic pain   HCG QUAL SERUM   ESTIMATED GFR   WET PREP   CHLAMYDIA/GC, PCR (GENITAL/ANAL SWAB)   WET PREP          COURSE & MEDICAL DECISION MAKING  Pertinent Labs & Imaging studies reviewed. (See chart for details)    Patient presents with burning and irritation in the vulvar region.  Benign abdominal examination the patient although she reports abdominal pain on the triage note, does not have " pain in the abdomen.  No CVA tenderness to suggest renal involvement.  Low suspicion for kidney stone.  Urinalysis is negative for urinary tract infection, ureterolithiasis.  Patient's labs otherwise reassuring.  Low suspicion for PID.  We discussed the option of potential pelvic exam versus just self swabbing the patient prefers to self swab.  I believe that this is reasonable given low risk for PID.     Delay in disposition as unfortunately the wet swab was sent by lab to [facility], and .  The patient had to be swabbed.  The patient does not want a wait for the results.  I have agreed to call the patient once the results are available.    She is advised to follow-up with OB/GYN.  She was given the information for the [facility] but will attempt to establish with her own OB/GYN.  She was counseled on possible nonspecific vulvovaginitis and hypoallergenic product use, common irritants.  She was offered further STI testing including HIV and syphilis but declines.    The patient was given return precautions, anticipatory guidance, and the opportunity to ask questions prior to discharge.     8:34 PM  Patient updated on the results of her testing, no evidence of bacterial vaginosis, yeast infection, trichomonas.    Appropriate PPE were worn at this encounter.     FINAL IMPRESSION  1. Vulvar irritation           DISPOSITION:  Patient will be discharged home in stable condition.    FOLLOW UP:  Stephanie Ovalles M.D.  [address]  [address]  [facility] [id number] [phone]    Schedule an appointment as soon as possible for a visit       [facility] [address]  [facility] [address] [phone]    As needed    [facility], Emergency Dept  [address]  [facility] [address] [phone]    If symptoms worsen      This dictation was created using voice recognition software. The accuracy of the dictation is  limited to the abilities of the software. I expect there may be some errors of grammar and possibly content. The nursing notes were reviewed and certain aspects of this information were incorporated into this note.

## 2022-07-29 NOTE — DISCHARGE INSTRUCTIONS
You were seen in the emergency department for vulvar irritation.  Your urinalysis is reassuring, there is no signs of a urinary tract infection.  Your lab work was also reassuring.    Swab has been sent to the lab for gonorrhea and chlamydia.  This should result in the next few days and you will be contacted if it is abnormal.    We do recommend following up with an OB/GYN.  If you are unable to get established with an OB/GYN, you have been given the number for the women's health clinic.    Please return to the emergency department or seek medical attention if you develop:  Pain in the abdomen, fevers, vomiting, any other new or concerning findings

## 2022-08-23 ENCOUNTER — APPOINTMENT (OUTPATIENT)
Dept: OBGYN | Facility: CLINIC | Age: 32
End: 2022-08-23
Payer: MEDICAID

## 2022-08-24 ENCOUNTER — OFFICE VISIT (OUTPATIENT)
Dept: MEDICAL GROUP | Facility: MEDICAL CENTER | Age: 32
End: 2022-08-24
Attending: INTERNAL MEDICINE
Payer: MEDICAID

## 2022-08-24 ENCOUNTER — PATIENT MESSAGE (OUTPATIENT)
Dept: MEDICAL GROUP | Facility: MEDICAL CENTER | Age: 32
End: 2022-08-24

## 2022-08-24 VITALS
RESPIRATION RATE: 16 BRPM | OXYGEN SATURATION: 95 % | WEIGHT: 138 LBS | BODY MASS INDEX: 22.18 KG/M2 | DIASTOLIC BLOOD PRESSURE: 70 MMHG | HEART RATE: 78 BPM | TEMPERATURE: 98.1 F | HEIGHT: 66 IN | SYSTOLIC BLOOD PRESSURE: 104 MMHG

## 2022-08-24 DIAGNOSIS — N94.9 PERINEAL DISCOMFORT IN FEMALE: ICD-10-CM

## 2022-08-24 PROBLEM — Z12.4 SCREENING FOR MALIGNANT NEOPLASM OF CERVIX: Status: RESOLVED | Noted: 2021-10-01 | Resolved: 2022-08-24

## 2022-08-24 PROCEDURE — 99212 OFFICE O/P EST SF 10 MIN: CPT | Performed by: INTERNAL MEDICINE

## 2022-08-24 ASSESSMENT — FIBROSIS 4 INDEX: FIB4 SCORE: 0.76

## 2022-08-24 NOTE — ASSESSMENT & PLAN NOTE
"Reports itching and burning intermittently in the vaginal area that has been present for several months. PAP done in May when symptoms started which was normal.  Gonorrhea, chlamydia, trich, BV, and yeast testing negative.  Denies discharge.  Denies skin lesions but feels \"two hard bumps\" inside the vagina.  Tried to see GYN yesterday ([facility]) but told they did not have a provider available and no appointments for the next month after they checked her in.  Denies pelvic pain, fevers/chills, dysuria.  Was seen in the ED for these symptoms in and had a normal UA with negative vaginal pathogen testing at that time.  She has been very worried about her symptoms.  "

## 2022-08-24 NOTE — PROGRESS NOTES
"Subjective:   Kalli Jj is a 32 y.o. female here today for continued vaginal irritation    Perineal discomfort in female  Reports itching and burning intermittently in the vaginal area that has been present for several months. PAP done in May when symptoms started which was normal.  Gonorrhea, chlamydia, trich, BV, and yeast testing negative.  Denies discharge.  Denies skin lesions but feels \"two hard bumps\" inside the vagina.  [facility] ([facility]) but told they did not have a provider available and no appointments for the next month after they checked her in.  Denies pelvic pain, fevers/chills, dysuria.  Was seen in the ED for these symptoms in and had a normal UA with negative vaginal pathogen testing at that time.  She has been very worried about her symptoms.       Current medicines (including changes today)  Current Outpatient Medications   Medication Sig Dispense Refill    VITAMIN D PO Take 1 Tablet by mouth every day.      traZODone (DESYREL) 50 MG Tab Take 1-2 Tablets by mouth at bedtime as needed for Sleep. 30 Tablet 1    Multiple Vitamins-Minerals (ZINC PO) Take  by mouth.       No current facility-administered medications for this visit.     She  has a past medical history of Abnormal Pap smear, low grade squamous intraepithelial lesion (LGSIL) (12/11/2012), COVID-19, and Depression.         Objective:     Vitals:    08/24/22 1345   BP: 104/70   Pulse: 78   Resp: 16   Temp: 36.7 °C (98.1 °F)   SpO2: 95%     Body mass index is 22.28 kg/m².   Physical Exam:  Constitutional: Alert, no distress.  Skin: Warm, dry, good turgor, no rashes in visible areas.  Eye: Equal, round and reactive, conjunctiva clear, lids normal.  : external genitalia normal, no rashes or lesions, no obvious masses or intravaginal lesions    Assessment and Plan:   The following treatment plan was discussed    1. Perineal discomfort in female  With unremarkable exam, etiology unclear. " Reassurance provided in light of negative testing.  She has an alternative GYN referral in place and I have given her this information so that she can reach out for an appointment.        Followup: Return if symptoms worsen or fail to improve.

## 2022-08-24 NOTE — PATIENT INSTRUCTIONS
[facility]  [address] [place] [address], [address]  [facility] [address]  Phone: [phone]  Fax: [phone]

## 2022-08-26 ENCOUNTER — PATIENT MESSAGE (OUTPATIENT)
Dept: MEDICAL GROUP | Facility: MEDICAL CENTER | Age: 32
End: 2022-08-26
Payer: MEDICAID

## 2022-09-06 ENCOUNTER — APPOINTMENT (OUTPATIENT)
Dept: LAB | Facility: MEDICAL CENTER | Age: 32
End: 2022-09-06
Payer: MEDICAID

## 2022-09-09 ENCOUNTER — HOSPITAL ENCOUNTER (OUTPATIENT)
Facility: MEDICAL CENTER | Age: 32
End: 2022-09-09
Attending: INTERNAL MEDICINE
Payer: MEDICAID

## 2022-09-09 ENCOUNTER — OFFICE VISIT (OUTPATIENT)
Dept: MEDICAL GROUP | Facility: MEDICAL CENTER | Age: 32
End: 2022-09-09
Attending: INTERNAL MEDICINE
Payer: MEDICAID

## 2022-09-09 VITALS
WEIGHT: 136 LBS | DIASTOLIC BLOOD PRESSURE: 74 MMHG | RESPIRATION RATE: 16 BRPM | HEIGHT: 66 IN | TEMPERATURE: 97.1 F | HEART RATE: 78 BPM | OXYGEN SATURATION: 99 % | SYSTOLIC BLOOD PRESSURE: 108 MMHG | BODY MASS INDEX: 21.86 KG/M2

## 2022-09-09 DIAGNOSIS — N94.9 PERINEAL DISCOMFORT IN FEMALE: ICD-10-CM

## 2022-09-09 PROCEDURE — 87205 SMEAR GRAM STAIN: CPT

## 2022-09-09 PROCEDURE — 99213 OFFICE O/P EST LOW 20 MIN: CPT | Performed by: INTERNAL MEDICINE

## 2022-09-09 PROCEDURE — 87077 CULTURE AEROBIC IDENTIFY: CPT

## 2022-09-09 PROCEDURE — 87070 CULTURE OTHR SPECIMN AEROBIC: CPT

## 2022-09-09 PROCEDURE — 99212 OFFICE O/P EST SF 10 MIN: CPT | Performed by: INTERNAL MEDICINE

## 2022-09-09 ASSESSMENT — FIBROSIS 4 INDEX: FIB4 SCORE: 0.76

## 2022-09-09 NOTE — PROGRESS NOTES
"Subjective:   Kalli Jj is a 32 y.o. female here today for vaginal testing    Perineal discomfort in female  She presents today for collection of genital swab.  She continues to report intermittent vaginal discomfort which she describes as \"burning\" and that it does not feel \"quite right\".  She is not having significant discharge.  Symptoms tend to be worse after her menstrual cycle.  She would like to be tested for mycoplasma and Ureaplasma.       Current medicines (including changes today)  Current Outpatient Medications   Medication Sig Dispense Refill    VITAMIN D PO Take 1 Tablet by mouth every day.      traZODone (DESYREL) 50 MG Tab Take 1-2 Tablets by mouth at bedtime as needed for Sleep. 30 Tablet 1    Multiple Vitamins-Minerals (ZINC PO) Take  by mouth.       No current facility-administered medications for this visit.     She  has a past medical history of Abnormal Pap smear, low grade squamous intraepithelial lesion (LGSIL) (12/11/2012), COVID-19, and Depression.         Objective:     Vitals:    09/09/22 1418   BP: 108/74   Pulse: 78   Resp: 16   Temp: 36.2 °C (97.1 °F)   SpO2: 99%     Body mass index is 21.95 kg/m².   Physical Exam:  Constitutional: Alert, no distress.  Skin: Warm, dry, good turgor, no rashes in visible areas.  Eye: Equal, round and reactive, conjunctiva clear, lids normal.      Assessment and Plan:   The following treatment plan was discussed    1. Perineal discomfort in female  Culture collected to rule out mycoplasma or Ureaplasma infection.  - GENITAL CULTURE, ROUTINE        Followup: Return if symptoms worsen or fail to improve.           "

## 2022-09-09 NOTE — ASSESSMENT & PLAN NOTE
"She presents today for collection of genital swab.  She continues to report intermittent vaginal discomfort which she describes as \"burning\" and that it does not feel \"quite right\".  She is not having significant discharge.  Symptoms tend to be worse after her menstrual cycle.  She would like to be tested for mycoplasma and Ureaplasma.  "

## 2022-09-10 LAB
GRAM STN SPEC: NORMAL
SIGNIFICANT IND 70042: NORMAL
SITE SITE: NORMAL
SOURCE SOURCE: NORMAL

## 2022-09-12 ENCOUNTER — PATIENT MESSAGE (OUTPATIENT)
Dept: MEDICAL GROUP | Facility: MEDICAL CENTER | Age: 32
End: 2022-09-12
Payer: MEDICAID

## 2022-09-12 DIAGNOSIS — Z11.3 SCREEN FOR STD (SEXUALLY TRANSMITTED DISEASE): ICD-10-CM

## 2022-09-12 LAB
BACTERIA WND AEROBE CULT: ABNORMAL
BACTERIA WND AEROBE CULT: ABNORMAL
GRAM STN SPEC: ABNORMAL
SIGNIFICANT IND 70042: ABNORMAL
SITE SITE: ABNORMAL
SOURCE SOURCE: ABNORMAL

## 2022-09-13 ENCOUNTER — HOSPITAL ENCOUNTER (OUTPATIENT)
Dept: LAB | Facility: MEDICAL CENTER | Age: 32
End: 2022-09-13
Attending: INTERNAL MEDICINE
Payer: MEDICAID

## 2022-09-13 ENCOUNTER — PATIENT MESSAGE (OUTPATIENT)
Dept: MEDICAL GROUP | Facility: MEDICAL CENTER | Age: 32
End: 2022-09-13
Payer: MEDICAID

## 2022-09-13 DIAGNOSIS — Z11.3 SCREEN FOR STD (SEXUALLY TRANSMITTED DISEASE): ICD-10-CM

## 2022-09-13 PROCEDURE — 86696 HERPES SIMPLEX TYPE 2 TEST: CPT

## 2022-09-13 PROCEDURE — 36415 COLL VENOUS BLD VENIPUNCTURE: CPT

## 2022-09-13 PROCEDURE — 86695 HERPES SIMPLEX TYPE 1 TEST: CPT

## 2022-09-13 PROCEDURE — 86694 HERPES SIMPLEX NES ANTBDY: CPT

## 2022-09-16 ENCOUNTER — PATIENT MESSAGE (OUTPATIENT)
Dept: MEDICAL GROUP | Facility: MEDICAL CENTER | Age: 32
End: 2022-09-16
Payer: MEDICAID

## 2022-09-16 LAB
HSV1 GG IGG SER-ACNC: 15.1 IV
HSV1+2 IGG SER IA-ACNC: >22.4 IV
HSV2 GG IGG SER-ACNC: 0.19 IV

## 2022-09-20 ENCOUNTER — PATIENT MESSAGE (OUTPATIENT)
Dept: MEDICAL GROUP | Facility: MEDICAL CENTER | Age: 32
End: 2022-09-20
Payer: MEDICAID

## 2022-10-04 ENCOUNTER — GYNECOLOGY VISIT (OUTPATIENT)
Dept: OBGYN | Facility: CLINIC | Age: 32
End: 2022-10-04
Payer: MEDICAID

## 2022-10-04 VITALS
HEIGHT: 66 IN | WEIGHT: 138.2 LBS | SYSTOLIC BLOOD PRESSURE: 106 MMHG | DIASTOLIC BLOOD PRESSURE: 62 MMHG | BODY MASS INDEX: 22.21 KG/M2

## 2022-10-04 DIAGNOSIS — N94.9 VAGINAL DISCOMFORT: ICD-10-CM

## 2022-10-04 PROCEDURE — 99213 OFFICE O/P EST LOW 20 MIN: CPT | Performed by: NURSE PRACTITIONER

## 2022-10-04 RX ORDER — CEPHALEXIN 500 MG/1
500 CAPSULE ORAL 4 TIMES DAILY
Qty: 7 CAPSULE | Refills: 0 | Status: SHIPPED | OUTPATIENT
Start: 2022-10-04 | End: 2022-10-04

## 2022-10-04 RX ORDER — CEPHALEXIN 500 MG/1
500 CAPSULE ORAL 4 TIMES DAILY
Qty: 28 CAPSULE | Refills: 0 | Status: SHIPPED | OUTPATIENT
Start: 2022-10-04 | End: 2022-10-11

## 2022-10-04 ASSESSMENT — FIBROSIS 4 INDEX: FIB4 SCORE: 0.76

## 2022-10-04 NOTE — PROGRESS NOTES
New Pt: GYN visit for vaginal dryness and irritation.   LMP: 09/26/2022  BC: none  Not sexually active currently  Last pap: 05/2022 Negative  WT: 138.2 lb   BP: 106/62  Good # [phone]

## 2022-10-04 NOTE — PROGRESS NOTES
HPI Comments: Kalli Jj is a 32 y.o. y.o. female who presents for problem Gynecologic Exam.       Pt has reports dryness and vaginal discharge complaints. She has been seen by PCP for same, all labs normal. She would like to discuss labs today and treatment today.    Patient's last menstrual period was 09/26/2022.  Pt is not sexually active   She is currently no using BCM  .  ROS: Patient is feeling well. No dyspnea or chest pain on exertion. No Abdominal pain, change in bowel habits, black or bloody stools. No urinary sx. GYN ROS:normal menses, no abnormal bleeding, pelvic pain. Reports no discharge, no breast pain or new or enlarging lumps on self exam. Denies breast tenderness, mass, discharge, changes in size or contour, or abnormal cyclic discomfort. No neurological complaints.  Pertinent positives documented in HPI and all other systems reviewed & are negative    All PMH, PSH, allergies, social history and FH reviewed and updated today:  Past Medical History:   Diagnosis Date    Abnormal Pap smear, low grade squamous intraepithelial lesion (LGSIL) 12/11/2012    COVID-19     Depression      Past Surgical History:   Procedure Laterality Date    OTHER      breast lift     Patient has no known allergies.  Social History     Socioeconomic History    Marital status: Single   Tobacco Use    Smoking status: Never    Smokeless tobacco: Never   Vaping Use    Vaping Use: Never used   Substance and Sexual Activity    Alcohol use: Yes     Comment: 1 drink per month    Drug use: No    Sexual activity: Not Currently     Partners: Male     Birth control/protection: None     Comment: not      Family History   Problem Relation Age of Onset    Hypertension Mother     Cancer Mother         cervical    Diabetes Father     Hypertension Father     Alcohol/Drug Maternal Grandfather     Diabetes Paternal Grandmother      Medications:   Current Outpatient Medications Ordered in Epic   Medication Sig Dispense  "Refill    cephALEXin (KEFLEX) 500 MG Cap Take 1 Capsule by mouth 4 times a day for 7 days. 28 Capsule 0    VITAMIN D PO Take 1 Tablet by mouth every day. (Patient not taking: Reported on 10/4/2022)      traZODone (DESYREL) 50 MG Tab Take 1-2 Tablets by mouth at bedtime as needed for Sleep. (Patient not taking: Reported on 10/4/2022) 30 Tablet 1    Multiple Vitamins-Minerals (ZINC PO) Take  by mouth. (Patient not taking: Reported on 10/4/2022)       No current [facility]-ordered facility-administered medications on file.          Objective:   Vital measurements:  /62 (BP Location: Right arm, Patient Position: Sitting, BP Cuff Size: Adult)   Ht 5' 6\"   Wt 138 lb 3.2 oz   Body mass index is 22.31 kg/m². (Goal BM I>18 <25)    Physical Exam   Nursing note and vitals reviewed.  Constitutional: She is oriented to person, place, and time. She appears well-developed and well-nourished. No distress.     HEENT:   Head: Normocephalic and atraumatic.   Right Ear: External ear normal.   Left Ear: External ear normal.   Nose: Nose normal.   Eyes: Conjunctivae and EOM are normal. Pupils are equal, round, and reactive to light. No scleral icterus.     Neck: Normal range of motion. Neck supple. No tracheal deviation present. No thyromegaly present.     Pulmonary/Chest: Effort normal and breath sounds normal. No respiratory distress. She has no wheezes. She has no rales. She exhibits no tenderness.     Cardiovascular: Regular, rate and rhythm. No JVD.    Abdominal: Soft. Bowel sounds are normal. She exhibits no distension and no mass. No tenderness. She has no rebound and no guarding.     Breast:  declined    Genitourinary:  declines    Musculoskeletal: Normal range of motion. She exhibits no edema and no tenderness.     Lymphadenopathy: She has no cervical adenopathy.     Neurological: She is alert and oriented to person, place, and time. She exhibits normal muscle tone.     Skin: Skin is warm and dry. No rash noted. She is not " diaphoretic. No erythema. No pallor.     Psychiatric: She has a normal mood and affect. Her behavior is normal. Judgment and thought content normal.               Assessment:     1. Vaginal discomfort            Assessment:  well woman      Plan:   physical exam not performed performed  Face to face 30 min visit for lab results and explanations  Counseling: STD prevention  Encourage exercise and proper diet and use of probiotic while on abx  Rx for Keflex.

## 2022-10-06 ENCOUNTER — TELEPHONE (OUTPATIENT)
Dept: OBGYN | Facility: CLINIC | Age: 32
End: 2022-10-06
Payer: MEDICAID

## 2022-10-06 RX ORDER — CLINDAMYCIN HYDROCHLORIDE 300 MG/1
300 CAPSULE ORAL 2 TIMES DAILY
Qty: 10 CAPSULE | Refills: 0 | Status: SHIPPED | OUTPATIENT
Start: 2022-10-06 | End: 2022-10-11

## 2022-10-06 NOTE — TELEPHONE ENCOUNTER
Pt called because she believes the medication that was prescribed for her strep B is incorrect. Pt is requesting clindamycin to treat it because she goggled it. I stated I will send a request to [facility] to ask for a change in medication.

## 2022-10-07 ENCOUNTER — TELEPHONE (OUTPATIENT)
Dept: OBGYN | Facility: CLINIC | Age: 32
End: 2022-10-07
Payer: MEDICAID

## 2022-10-07 NOTE — TELEPHONE ENCOUNTER
Phone Number Called: [phone]    Call outcome: Did not leave a detailed message. Requested patient to call back.    Message:     From [facility]:    I prescribed Keflex because some bacteria types can be resistant to clindamycin. However, if the pt would like to try this route, I sent in a RX for her to  at her pharmacy

## 2022-12-27 ENCOUNTER — PATIENT MESSAGE (OUTPATIENT)
Dept: MEDICAL GROUP | Facility: MEDICAL CENTER | Age: 32
End: 2022-12-27
Payer: MEDICAID

## 2023-01-20 ENCOUNTER — PATIENT MESSAGE (OUTPATIENT)
Dept: MEDICAL GROUP | Facility: MEDICAL CENTER | Age: 33
End: 2023-01-20
Payer: MEDICAID

## 2023-03-23 ENCOUNTER — HOSPITAL ENCOUNTER (EMERGENCY)
Facility: MEDICAL CENTER | Age: 33
End: 2023-03-24
Attending: EMERGENCY MEDICINE
Payer: MEDICAID

## 2023-03-23 DIAGNOSIS — O21.0 HYPEREMESIS GRAVIDARUM: ICD-10-CM

## 2023-03-23 LAB
ALBUMIN SERPL BCP-MCNC: 4.3 G/DL (ref 3.2–4.9)
ALBUMIN/GLOB SERPL: 1.3 G/DL
ALP SERPL-CCNC: 38 U/L (ref 30–99)
ALT SERPL-CCNC: 12 U/L (ref 2–50)
ANION GAP SERPL CALC-SCNC: 12 MMOL/L (ref 7–16)
APPEARANCE UR: CLEAR
AST SERPL-CCNC: 17 U/L (ref 12–45)
B-HCG SERPL-ACNC: ABNORMAL MIU/ML (ref 0–10)
BASOPHILS # BLD AUTO: 0.2 % (ref 0–1.8)
BASOPHILS # BLD: 0.01 K/UL (ref 0–0.12)
BILIRUB SERPL-MCNC: 0.2 MG/DL (ref 0.1–1.5)
BILIRUB UR QL STRIP.AUTO: NEGATIVE
BUN SERPL-MCNC: 11 MG/DL (ref 8–22)
CALCIUM ALBUM COR SERPL-MCNC: 9.3 MG/DL (ref 8.5–10.5)
CALCIUM SERPL-MCNC: 9.5 MG/DL (ref 8.4–10.2)
CHLORIDE SERPL-SCNC: 101 MMOL/L (ref 96–112)
CO2 SERPL-SCNC: 22 MMOL/L (ref 20–33)
COLOR UR: YELLOW
CREAT SERPL-MCNC: 0.63 MG/DL (ref 0.5–1.4)
EOSINOPHIL # BLD AUTO: 0.04 K/UL (ref 0–0.51)
EOSINOPHIL NFR BLD: 0.6 % (ref 0–6.9)
ERYTHROCYTE [DISTWIDTH] IN BLOOD BY AUTOMATED COUNT: 51.8 FL (ref 35.9–50)
GFR SERPLBLD CREATININE-BSD FMLA CKD-EPI: 119 ML/MIN/1.73 M 2
GLOBULIN SER CALC-MCNC: 3.2 G/DL (ref 1.9–3.5)
GLUCOSE SERPL-MCNC: 99 MG/DL (ref 65–99)
GLUCOSE UR STRIP.AUTO-MCNC: NEGATIVE MG/DL
HCT VFR BLD AUTO: 33.8 % (ref 37–47)
HGB BLD-MCNC: 10.3 G/DL (ref 12–16)
IMM GRANULOCYTES # BLD AUTO: 0.01 K/UL (ref 0–0.11)
IMM GRANULOCYTES NFR BLD AUTO: 0.2 % (ref 0–0.9)
KETONES UR STRIP.AUTO-MCNC: NEGATIVE MG/DL
LEUKOCYTE ESTERASE UR QL STRIP.AUTO: NEGATIVE
LYMPHOCYTES # BLD AUTO: 1.6 K/UL (ref 1–4.8)
LYMPHOCYTES NFR BLD: 25.1 % (ref 22–41)
MCH RBC QN AUTO: 22.1 PG (ref 27–33)
MCHC RBC AUTO-ENTMCNC: 30.5 G/DL (ref 33.6–35)
MCV RBC AUTO: 72.5 FL (ref 81.4–97.8)
MICRO URNS: NORMAL
MONOCYTES # BLD AUTO: 0.64 K/UL (ref 0–0.85)
MONOCYTES NFR BLD AUTO: 10 % (ref 0–13.4)
NEUTROPHILS # BLD AUTO: 4.08 K/UL (ref 2–7.15)
NEUTROPHILS NFR BLD: 63.9 % (ref 44–72)
NITRITE UR QL STRIP.AUTO: NEGATIVE
NRBC # BLD AUTO: 0 K/UL
NRBC BLD-RTO: 0 /100 WBC
NUMBER OF RH DOSES IND 8505RD: NORMAL
PH UR STRIP.AUTO: 6 [PH] (ref 5–8)
PLATELET # BLD AUTO: 233 K/UL (ref 164–446)
POTASSIUM SERPL-SCNC: 3.7 MMOL/L (ref 3.6–5.5)
PROT SERPL-MCNC: 7.5 G/DL (ref 6–8.2)
PROT UR QL STRIP: NEGATIVE MG/DL
RBC # BLD AUTO: 4.66 M/UL (ref 4.2–5.4)
RBC UR QL AUTO: NEGATIVE
RH BLD: NORMAL
SODIUM SERPL-SCNC: 135 MMOL/L (ref 135–145)
SP GR UR STRIP.AUTO: 1.02
WBC # BLD AUTO: 6.4 K/UL (ref 4.8–10.8)

## 2023-03-23 PROCEDURE — 84702 CHORIONIC GONADOTROPIN TEST: CPT

## 2023-03-23 PROCEDURE — 81003 URINALYSIS AUTO W/O SCOPE: CPT

## 2023-03-23 PROCEDURE — 86901 BLOOD TYPING SEROLOGIC RH(D): CPT

## 2023-03-23 PROCEDURE — 36415 COLL VENOUS BLD VENIPUNCTURE: CPT

## 2023-03-23 PROCEDURE — 80053 COMPREHEN METABOLIC PANEL: CPT

## 2023-03-23 PROCEDURE — 85025 COMPLETE CBC W/AUTO DIFF WBC: CPT

## 2023-03-23 PROCEDURE — 700111 HCHG RX REV CODE 636 W/ 250 OVERRIDE (IP): Performed by: EMERGENCY MEDICINE

## 2023-03-23 PROCEDURE — 99283 EMERGENCY DEPT VISIT LOW MDM: CPT

## 2023-03-23 RX ORDER — ONDANSETRON 4 MG/1
4 TABLET, ORALLY DISINTEGRATING ORAL ONCE
Status: COMPLETED | OUTPATIENT
Start: 2023-03-23 | End: 2023-03-23

## 2023-03-23 RX ADMIN — ONDANSETRON 4 MG: 4 TABLET, ORALLY DISINTEGRATING ORAL at 22:54

## 2023-03-23 ASSESSMENT — LIFESTYLE VARIABLES
TOTAL SCORE: 0
TOTAL SCORE: 0
HAVE YOU EVER FELT YOU SHOULD CUT DOWN ON YOUR DRINKING: NO
EVER FELT BAD OR GUILTY ABOUT YOUR DRINKING: NO
DO YOU DRINK ALCOHOL: NO
AVERAGE NUMBER OF DAYS PER WEEK YOU HAVE A DRINK CONTAINING ALCOHOL: 0
EVER HAD A DRINK FIRST THING IN THE MORNING TO STEADY YOUR NERVES TO GET RID OF A HANGOVER: NO
CONSUMPTION TOTAL: NEGATIVE
ON A TYPICAL DAY WHEN YOU DRINK ALCOHOL HOW MANY DRINKS DO YOU HAVE: 0
HOW MANY TIMES IN THE PAST YEAR HAVE YOU HAD 5 OR MORE DRINKS IN A DAY: 0
TOTAL SCORE: 0
HAVE PEOPLE ANNOYED YOU BY CRITICIZING YOUR DRINKING: NO

## 2023-03-23 ASSESSMENT — FIBROSIS 4 INDEX: FIB4 SCORE: 0.79

## 2023-03-24 VITALS
RESPIRATION RATE: 18 BRPM | OXYGEN SATURATION: 95 % | TEMPERATURE: 97.6 F | HEART RATE: 75 BPM | DIASTOLIC BLOOD PRESSURE: 78 MMHG | HEIGHT: 66 IN | WEIGHT: 146.61 LBS | BODY MASS INDEX: 23.56 KG/M2 | SYSTOLIC BLOOD PRESSURE: 115 MMHG

## 2023-03-24 RX ORDER — ONDANSETRON 4 MG/1
4 TABLET, ORALLY DISINTEGRATING ORAL EVERY 8 HOURS PRN
Qty: 5 TABLET | Refills: 0 | Status: SHIPPED | OUTPATIENT
Start: 2023-03-24 | End: 2023-03-30 | Stop reason: SDUPTHER

## 2023-03-24 NOTE — ED NOTES
Pt refused an IV and would like a butterfly straight stick performed. RN performed blood and sent blood to lab.

## 2023-03-24 NOTE — ED PROVIDER NOTES
"ED Provider Note    CHIEF COMPLAINT  Chief Complaint   Patient presents with    Nausea     With one episode of emesis yesterday.     Pregnancy     Reports she is approx 6 wk pregnant, 1st day LMP Feb 7th.        EXTERNAL RECORDS REVIEWED  Outpatient Notes reviewed progress note by Dr. Ovalles dated September 9, 2022, patient had perineal discomfort genital culture sent    HPI/ROS  LIMITATION TO HISTORY   Select: : None  OUTSIDE HISTORIAN(S):  None available    Kalli Jj is a 33 y.o. female who presents for evaluation of nausea and vomiting.  Patient is G4, P2 at 6 weeks and 3 days; she does note she had \"morning sickness\" with her previous pregnancies.  She has been so ill for the last month, she reassuringly has not vomited today, last vomiting was yesterday.  She notes over poor oral intake though she has been able to tolerate ice chips and small amounts of food.  No fever, no ill contacts, no diarrhea.  No dysuria, no acute vaginal bleeding, no abdominal pain or pelvic pain.  She does have an OB/GYN with whom she has scheduled, she will be seeing Dr. Valerio later on in her first trimester.  No particular ill contacts.  No diarrhea, no constipation.  Given symptoms for a week and no better with Tums she came to be assessed today.    PAST MEDICAL HISTORY   has a past medical history of Abnormal Pap smear, low grade squamous intraepithelial lesion (LGSIL) (12/11/2012), COVID-19, and Depression.    SURGICAL HISTORY   has a past surgical history that includes other.    FAMILY HISTORY  Family History   Problem Relation Age of Onset    Hypertension Mother     Cancer Mother         cervical    Diabetes Father     Hypertension Father     Alcohol/Drug Maternal Grandfather     Diabetes Paternal Grandmother    No history of peptic ulcer disease in immediate family.    SOCIAL HISTORY  Social History     Tobacco Use    Smoking status: Never    Smokeless tobacco: Never   Vaping Use    Vaping Use: Never used " "  Substance and Sexual Activity    Alcohol use: Not Currently     Comment: 1 drink per month    Drug use: No    Sexual activity: Not Currently     Partners: Male     Birth control/protection: None     Comment: not        CURRENT MEDICATIONS  Home Medications       Reviewed by Alysha Tafoya R.N. (Registered Nurse) on 03/23/23 at 2214  Med List Status: Not Addressed     Medication Last Dose Status   Multiple Vitamins-Minerals (ZINC PO)  Active   traZODone (DESYREL) 50 MG Tab  Active   VITAMIN D PO  Active                    ALLERGIES  No Known Allergies    PHYSICAL EXAM  VITAL SIGNS: /78   Pulse 75   Temp 36.4 °C (97.6 °F) (Temporal)   Resp 18   Ht 1.676 m (5' 6\")   Wt 66.5 kg (146 lb 9.7 oz)   LMP 02/07/2023   SpO2 95%   BMI 23.66 kg/m²    General: Alert, no acute distress  Skin: Warm, dry, normal for ethnicity  Head: Normocephalic, atraumatic  Neck: Trachea midline, no tenderness  Eye: PERRL, normal conjunctiva without pallor  Cardiovascular: Regular rate and rhythm, No murmur, Normal peripheral perfusion  Respiratory: Lungs CTA, respirations are non-labored, breath sounds are equal  Gastrointestinal: Soft, nontender, non distended.  No guarding, rebound, or rigidity.  Musculoskeletal: No swelling, no deformity  Neurological: Alert and oriented to person, place, time, and situation  Lymphatics: No lymphadenopathy  Psychiatric: Cooperative, appropriate mood & affect     LABS  Results for orders placed or performed during the hospital encounter of 03/23/23   CBC WITH DIFFERENTIAL   Result Value Ref Range    WBC 6.4 4.8 - 10.8 K/uL    RBC 4.66 4.20 - 5.40 M/uL    Hemoglobin 10.3 (L) 12.0 - 16.0 g/dL    Hematocrit 33.8 (L) 37.0 - 47.0 %    MCV 72.5 (L) 81.4 - 97.8 fL    MCH 22.1 (L) 27.0 - 33.0 pg    MCHC 30.5 (L) 33.6 - 35.0 g/dL    RDW 51.8 (H) 35.9 - 50.0 fL    Platelet Count 233 164 - 446 K/uL    Neutrophils-Polys 63.90 44.00 - 72.00 %    Lymphocytes 25.10 22.00 - 41.00 %    Monocytes 10.00 " 0.00 - 13.40 %    Eosinophils 0.60 0.00 - 6.90 %    Basophils 0.20 0.00 - 1.80 %    Immature Granulocytes 0.20 0.00 - 0.90 %    Nucleated RBC 0.00 /100 WBC    Neutrophils (Absolute) 4.08 2.00 - 7.15 K/uL    Lymphs (Absolute) 1.60 1.00 - 4.80 K/uL    Monos (Absolute) 0.64 0.00 - 0.85 K/uL    Eos (Absolute) 0.04 0.00 - 0.51 K/uL    Baso (Absolute) 0.01 0.00 - 0.12 K/uL    Immature Granulocytes (abs) 0.01 0.00 - 0.11 K/uL    NRBC (Absolute) 0.00 K/uL   COMP METABOLIC PANEL   Result Value Ref Range    Sodium 135 135 - 145 mmol/L    Potassium 3.7 3.6 - 5.5 mmol/L    Chloride 101 96 - 112 mmol/L    Co2 22 20 - 33 mmol/L    Anion Gap 12.0 7.0 - 16.0    Glucose 99 65 - 99 mg/dL    Bun 11 8 - 22 mg/dL    Creatinine 0.63 0.50 - 1.40 mg/dL    Calcium 9.5 8.4 - 10.2 mg/dL    AST(SGOT) 17 12 - 45 U/L    ALT(SGPT) 12 2 - 50 U/L    Alkaline Phosphatase 38 30 - 99 U/L    Total Bilirubin 0.2 0.1 - 1.5 mg/dL    Albumin 4.3 3.2 - 4.9 g/dL    Total Protein 7.5 6.0 - 8.2 g/dL    Globulin 3.2 1.9 - 3.5 g/dL    A-G Ratio 1.3 g/dL   URINALYSIS (UA)    Specimen: Urine   Result Value Ref Range    Color Yellow     Character Clear     Specific Gravity 1.025 <1.035    Ph 6.0 5.0 - 8.0    Glucose Negative Negative mg/dL    Ketones Negative Negative mg/dL    Protein Negative Negative mg/dL    Bilirubin Negative Negative    Nitrite Negative Negative    Leukocyte Esterase Negative Negative    Occult Blood Negative Negative    Micro Urine Req see below    HCG QUANTITATIVE SERUM   Result Value Ref Range    Bhcg 14208.0 (H) 0.0 - 10.0 mIU/mL   RH TYPE FOR RHOGAM FROM E.D.   Result Value Ref Range    Emergency Department Rh Typing POS     Number Of Rh Doses Indicated ZERO    CORRECTED CALCIUM   Result Value Ref Range    Correct Calcium 9.3 8.5 - 10.5 mg/dL   ESTIMATED GFR   Result Value Ref Range    GFR (CKD-EPI) 119 >60 mL/min/1.73 m 2   hCG is consistent for dates, she is Rh- and will not require RhoGAM        COURSE & MEDICAL DECISION MAKING    ED  Observation Status? Yes; I am placing the patient in to an observation status due to a diagnostic uncertainty as well as therapeutic intensity. Patient placed in observation status at 10:42 PM, 3/23/2023.     Observation plan is as follows: Patient treated with Zofran and ODT NPO.  Declines IV placement and IV fluids.  Follow-up work-up including Rh and beta quant will be obtained.  Differential includes but not limited to hyperemesis gravidarum, electrolyte disturbance, dehydration    0018: Patient reassessed, feeling much better after single dose of Zofran ODT.  I have updated her with reassuring work-up.    Upon Reevaluation, the patient's condition has: Improved; and will be discharged.    Patient discharged from ED Observation status at 0018 (Time) 3/25/23 (Date).     INITIAL ASSESSMENT, COURSE AND PLAN  Care Narrative: This is a very pleasant G4, P2 with 6 weeks and 3 days gestation who presents for evaluation of nausea and vomiting for the last week.  She declines IV fluids, reassuringly she is not tachycardic nor hypotensive.  As such metabolic work-up is obtained to evaluate for electrolyte derangement and dehydration.  Reassuringly given no acute vaginal bleeding, no pelvic or abdominal pain, there is no indication for emergent ultrasound at this time.  Patient treated with Zofran and work-up is reassuring, hCG consistent with dates, no evidence of renal failure nor hepatic insufficiency.  This is consistent with hyperemesis gravidarum, she is feeling much better with single dose of Zofran and is comfortable with outpatient management.        ADDITIONAL PROBLEM LIST  Nausea and vomiting, for trimester pregnancy  DISPOSITION AND DISCUSSIONS  I have discussed management of the patient with the following physicians and ROCCO's:  NA    Discussion of management with other QHP or appropriate source(s): None     Escalation of care considered, and ultimately not performed:diagnostic imaging pelvic ultrasound  considered but given no vaginal bleeding or pelvic pain this would definitely not be emergently indicated, potential risks outweigh benefits.    Barriers to care at this time, including but not limited to: .     Decision tools and prescription drugs considered including, but not limited to:  NA .    The patient will return for new or worsening symptoms and is stable at the time of discharge.       DISPOSITION:  Patient will be discharged home in stable condition.    FOLLOW UP:  Stephanie Ovalles M.D.  [address] [place] [id number]  [phone]            OUTPATIENT MEDICATIONS:  Discharge Medication List as of 3/24/2023 12:19 AM        START taking these medications    Details   ondansetron (ZOFRAN ODT) 4 MG TABLET DISPERSIBLE Take 1 Tablet by mouth every 8 hours as needed for Nausea/Vomiting., Disp-5 Tablet, R-0, Normal                FINAL DIAGNOSIS  1. Hyperemesis gravidarum           Electronically signed by: Markel Price M.D., 3/23/2023 10:46 PM

## 2023-03-30 ENCOUNTER — HOSPITAL ENCOUNTER (EMERGENCY)
Facility: MEDICAL CENTER | Age: 33
End: 2023-03-30
Attending: EMERGENCY MEDICINE
Payer: MEDICAID

## 2023-03-30 VITALS
HEIGHT: 66 IN | HEART RATE: 84 BPM | BODY MASS INDEX: 23.24 KG/M2 | TEMPERATURE: 97.6 F | RESPIRATION RATE: 16 BRPM | OXYGEN SATURATION: 98 % | WEIGHT: 144.62 LBS | SYSTOLIC BLOOD PRESSURE: 114 MMHG | DIASTOLIC BLOOD PRESSURE: 74 MMHG

## 2023-03-30 DIAGNOSIS — O21.0 HYPEREMESIS GRAVIDARUM: ICD-10-CM

## 2023-03-30 DIAGNOSIS — R11.2 NAUSEA AND VOMITING, UNSPECIFIED VOMITING TYPE: ICD-10-CM

## 2023-03-30 PROCEDURE — 99284 EMERGENCY DEPT VISIT MOD MDM: CPT

## 2023-03-30 RX ORDER — ONDANSETRON 4 MG/1
4 TABLET, ORALLY DISINTEGRATING ORAL EVERY 8 HOURS PRN
Qty: 10 TABLET | Refills: 2 | Status: SHIPPED | OUTPATIENT
Start: 2023-03-30 | End: 2023-11-13

## 2023-03-30 ASSESSMENT — FIBROSIS 4 INDEX: FIB4 SCORE: 0.72

## 2023-03-30 NOTE — ED PROVIDER NOTES
ED Provider Note    CHIEF COMPLAINT  Chief Complaint   Patient presents with    Medication Refill     Pt is here for a refill of her Zofran, she is pregnant and will not see her PCP for 2 more weeks. She was prescribed Zofran from a previous visit here.       EXTERNAL RECORDS REVIEWED  Other reviewed the patient's laboratory analysis and she did have a pregnancy test on 23 March with a level of 62,846 consistent with a first trimester pregnancy    HPI/ROS    Kalli Jj is a 33 y.o. female who presents with nausea and vomiting.  The patient states that she has been nauseated and has had periodic vomiting with her current pregnancy.  She states he is about 7 weeks pregnant she cannot get into see her primary care provider for 2 more weeks.  She is going to see Dr. Valerio for her current obstetric care.  She does not have any vaginal bleeding nor discharge.  She does not have any abdominal pain.  She has not had any associated fevers.  She denies difficulties with urination.    PAST MEDICAL HISTORY   has a past medical history of Abnormal Pap smear, low grade squamous intraepithelial lesion (LGSIL) (12/11/2012), COVID-19, and Depression.    SURGICAL HISTORY   has a past surgical history that includes other.    FAMILY HISTORY  Family History   Problem Relation Age of Onset    Hypertension Mother     Cancer Mother         cervical    Diabetes Father     Hypertension Father     Alcohol/Drug Maternal Grandfather     Diabetes Paternal Grandmother        SOCIAL HISTORY  Social History     Tobacco Use    Smoking status: Never    Smokeless tobacco: Never   Vaping Use    Vaping Use: Never used   Substance and Sexual Activity    Alcohol use: Not Currently     Comment: 1 drink per month    Drug use: No    Sexual activity: Not Currently     Partners: Male     Birth control/protection: None     Comment: not        CURRENT MEDICATIONS  Home Medications       Reviewed by Taniya Duke R.N. (Registered Nurse) on  "03/30/23 at 1520  Med List Status: Not Addressed     Medication Last Dose Status   Multiple Vitamins-Minerals (ZINC PO)  Active   ondansetron (ZOFRAN ODT) 4 MG TABLET DISPERSIBLE  Active   traZODone (DESYREL) 50 MG Tab  Active   VITAMIN D PO  Active                    ALLERGIES  No Known Allergies    PHYSICAL EXAM  VITAL SIGNS: /74   Pulse 84   Temp 36.4 °C (97.6 °F) (Temporal)   Resp 16   Ht 1.676 m (5' 6\")   Wt 65.6 kg (144 lb 10 oz)   LMP 02/07/2023 (Exact Date)   SpO2 98%   BMI 23.34 kg/m²    In general the patient does not appear toxic    Pulmonary chest clear to auscultation bilaterally    Cardiovascular S1-S2 with a regular rate and rhythm    GI abdomen soft with no tenderness and normal bowel sounds    Skin no pallor      COURSE & MEDICAL DECISION MAKING  This a 33-year-old female who presents the emergency department with nausea and vomiting.  I suspect this is from the first trimester pregnancy.  Her abdomen is benign.  She does not have any evidence of an infectious process.  She does not have any urinary symptoms.  Therefore laboratory and Alysis and urinalysis will not be obtained.  Due to the lack of vaginal bleeding, discharge, and abdominal pain we will also hold off on ultrasound imaging as she has a follow-up appointment with her obstetrician.  The patient did respond to Zofran and therefore she will be discharged home on Zofran with instructions to return for abdominal pain or vaginal bleeding.  She will follow-up with her obstetrician as scheduled.    FINAL DIAGNOSIS  1.  First trimester pregnancy  2.  Nausea and vomiting    Disposition  The patient will be discharged in stable condition       Electronically signed by: Brendan Feng M.D., 3/30/2023 3:28 PM      "

## 2023-03-30 NOTE — ED TRIAGE NOTES
Chief Complaint   Patient presents with    Medication Refill     Pt is here for a refill of her Zofran, she is pregnant and will not see her PCP for 2 more weeks. She was prescribed Zofran from a previous visit here.

## 2023-03-30 NOTE — ED NOTES
Pt given d/c paperwork; pt verbalized understanding all information given. Pt ambulated out of the ER w/o difficulty

## 2023-04-19 ENCOUNTER — HOSPITAL ENCOUNTER (EMERGENCY)
Facility: MEDICAL CENTER | Age: 33
End: 2023-04-19
Attending: EMERGENCY MEDICINE
Payer: MEDICAID

## 2023-04-19 VITALS
WEIGHT: 138.89 LBS | HEART RATE: 90 BPM | SYSTOLIC BLOOD PRESSURE: 115 MMHG | TEMPERATURE: 97.3 F | DIASTOLIC BLOOD PRESSURE: 72 MMHG | RESPIRATION RATE: 16 BRPM | HEIGHT: 66 IN | BODY MASS INDEX: 22.32 KG/M2 | OXYGEN SATURATION: 98 %

## 2023-04-19 DIAGNOSIS — N39.0 ACUTE UTI: ICD-10-CM

## 2023-04-19 DIAGNOSIS — R30.0 DYSURIA: ICD-10-CM

## 2023-04-19 DIAGNOSIS — Z3A.10 10 WEEKS GESTATION OF PREGNANCY: ICD-10-CM

## 2023-04-19 LAB
APPEARANCE UR: ABNORMAL
BACTERIA #/AREA URNS HPF: ABNORMAL /HPF
BILIRUB UR QL STRIP.AUTO: NEGATIVE
COLOR UR: ABNORMAL
EPI CELLS #/AREA URNS HPF: ABNORMAL /HPF
GLUCOSE UR STRIP.AUTO-MCNC: ABNORMAL MG/DL
KETONES UR STRIP.AUTO-MCNC: ABNORMAL MG/DL
LEUKOCYTE ESTERASE UR QL STRIP.AUTO: ABNORMAL
MICRO URNS: ABNORMAL
MUCOUS THREADS #/AREA URNS HPF: ABNORMAL /HPF
NITRITE UR QL STRIP.AUTO: POSITIVE
PH UR STRIP.AUTO: 5.5 [PH] (ref 5–8)
PROT UR QL STRIP: ABNORMAL MG/DL
RBC # URNS HPF: ABNORMAL /HPF
RBC UR QL AUTO: NEGATIVE
SP GR UR STRIP.AUTO: 1.02
WBC #/AREA URNS HPF: ABNORMAL /HPF

## 2023-04-19 PROCEDURE — 700102 HCHG RX REV CODE 250 W/ 637 OVERRIDE(OP): Performed by: EMERGENCY MEDICINE

## 2023-04-19 PROCEDURE — 81001 URINALYSIS AUTO W/SCOPE: CPT

## 2023-04-19 PROCEDURE — 99283 EMERGENCY DEPT VISIT LOW MDM: CPT

## 2023-04-19 PROCEDURE — 87086 URINE CULTURE/COLONY COUNT: CPT

## 2023-04-19 PROCEDURE — A9270 NON-COVERED ITEM OR SERVICE: HCPCS | Performed by: EMERGENCY MEDICINE

## 2023-04-19 RX ORDER — CEPHALEXIN 500 MG/1
500 CAPSULE ORAL 2 TIMES DAILY
Qty: 10 CAPSULE | Refills: 0 | Status: SHIPPED | OUTPATIENT
Start: 2023-04-19 | End: 2023-04-24

## 2023-04-19 RX ORDER — CEPHALEXIN 250 MG/1
500 CAPSULE ORAL ONCE
Status: COMPLETED | OUTPATIENT
Start: 2023-04-19 | End: 2023-04-19

## 2023-04-19 RX ADMIN — CEPHALEXIN 500 MG: 250 CAPSULE ORAL at 20:42

## 2023-04-19 ASSESSMENT — FIBROSIS 4 INDEX: FIB4 SCORE: 0.72

## 2023-04-20 NOTE — ED PROVIDER NOTES
ED Provider Note    CHIEF COMPLAINT  Chief Complaint   Patient presents with    Painful Urination     Denies flank pain or known fevers. Reports dysuria x approx 2 days. Pt. Is approx 10 weeks pregnant. Denies vaginal bleeding.       EXTERNAL RECORDS REVIEWED  Side records been reviewed with the patient had pregnancy test on 23 March that was a level of 62,846.  Patient been seen in the emergency department on 3/30/2023 for medication refill of her Zofran.    HPI/ROS  LIMITATION TO HISTORY   Select: : None  OUTSIDE HISTORIAN(S):  none    Kalli Joycelyn Jj is a 33 y.o. female who presents to the emergency room for concerns regarding ongoing painful urination.  Is been going on for approximately 2 days.  Has not been associated with any abdominal discomfort, distention, febrile illness or flank pain.  She has no prior history of kidney stones or infection.  She has not had any vaginal bleeding, no vaginal discharge.  She does not have any gross abdominal pain.  Has had establishment of pregnancy with her OB, she has plans to follow-up in the coming weeks for further testing.  She has not had any other complications or any acute concerns.  She has had no active cramping since her symptoms started.    PAST MEDICAL HISTORY   has a past medical history of Abnormal Pap smear, low grade squamous intraepithelial lesion (LGSIL) (12/11/2012), COVID-19, and Depression.    SURGICAL HISTORY   has a past surgical history that includes other.    FAMILY HISTORY  Family History   Problem Relation Age of Onset    Hypertension Mother     Cancer Mother         cervical    Diabetes Father     Hypertension Father     Alcohol/Drug Maternal Grandfather     Diabetes Paternal Grandmother        SOCIAL HISTORY  Social History     Tobacco Use    Smoking status: Never    Smokeless tobacco: Never   Vaping Use    Vaping Use: Never used   Substance and Sexual Activity    Alcohol use: Not Currently     Comment: 1 drink per month    Drug use: No  "   Sexual activity: Not Currently     Partners: Male     Birth control/protection: None     Comment: not        CURRENT MEDICATIONS  Home Medications       Reviewed by Alysha Tafoya R.N. (Registered Nurse) on 04/19/23 at 1847  Med List Status: Not Addressed     Medication Last Dose Status   Multiple Vitamins-Minerals (ZINC PO)  Active   ondansetron (ZOFRAN ODT) 4 MG TABLET DISPERSIBLE  Active   traZODone (DESYREL) 50 MG Tab  Active   VITAMIN D PO  Active                    ALLERGIES  No Known Allergies    PHYSICAL EXAM  VITAL SIGNS: /70   Pulse 86   Temp 36.3 °C (97.3 °F) (Temporal)   Resp 20   Ht 1.676 m (5' 6\")   Wt 63 kg (138 lb 14.2 oz)   LMP 02/07/2023 (Exact Date)   SpO2 96%   BMI 22.42 kg/m²    Genl: F sitting in chair comfortably, speaking clearly, appears in no acute distress   Head: NC/AT   ENT: Mucous membranes moist, posterior pharynx clear, uvula midline, nares patent bilaterally   Eyes: Normal sclera, pupils equal round reactive to light  Neck: Supple, FROM, no LAD appreciated   Pulmonary: Lungs are clear to auscultation bilaterally  Chest: No TTP  CV:  RRR, no murmur appreciated, pulses 2+ in both upper and lower extremities,  Abdomen: soft, NT/ND; no rebound/guarding, no masses palpated, no HSM   : no CVA or suprapubic tenderness   Musculoskeletal: Pain free ROM of the neck. Moving upper and lower extremities and spontaneous in coordinated fashion    DIAGNOSTIC STUDIES / PROCEDURES  LABS  Labs Reviewed   URINALYSIS,CULTURE IF INDICATED - Abnormal; Notable for the following components:       Result Value    Color Orange (*)     Character Cloudy (*)     Nitrite Positive (*)     Leukocyte Esterase Trace (*)     All other components within normal limits    Narrative:     Indication for culture:->Patient WITHOUT an indwelling Lechuga  catheter in place with new onset of Dysuria, Frequency,  Urgency, and/or Suprapubic pain   URINE MICROSCOPIC (W/UA) - Abnormal; Notable for the " following components:    -150 (*)     RBC 2-5 (*)     Bacteria Moderate (*)     All other components within normal limits    Narrative:     Indication for culture:->Patient WITHOUT an indwelling Lechuga  catheter in place with new onset of Dysuria, Frequency,  Urgency, and/or Suprapubic pain   URINE CULTURE(NEW)    Narrative:     Indication for culture:->Patient WITHOUT an indwelling Lechuga  catheter in place with new onset of Dysuria, Frequency,  Urgency, and/or Suprapubic pain       COURSE & MEDICAL DECISION MAKING    ED Observation Status? No; Patient does not meet criteria for ED Observation.     INITIAL ASSESSMENT, COURSE AND PLAN  Care Narrative: Patient presents emergency room for symptoms as described above.  She is alert, oriented with no localizing pain.  She has had dysuria for several days, she is currently in the first trimester pregnancy with no vaginal pain, no abdominal distention or other acute complaints.  Her vital signs are normal, urinalysis was obtained in triage and shows bacteriuria with 100 250 white cells with leukocyte esterase and nitrites.  She does not have clinical signs of pyelonephritis, she is not septic, she has no other localizing clinical exam findings that would point towards a concerning intra-abdominal etiology.  I do not believe any further diagnostic work-up is needed the patient was started on antibiotics here in the emergency department.  She will be started on an additional 5 days for acute cystitis and I would recommend follow-up in the coming week with her established OB or if she has any worsening pain, any migration of discomfort or if she is having breakthrough fevers while on antibiotics after the first 2 doses would recommend reassessment here in the emergency department.  Questions are addressed she is discharged home in stable condition.    FINAL DIAGNOSIS  1. Dysuria    2. Acute UTI    3. 10 weeks gestation of pregnancy      Electronically signed by: Jose Alberto  SHIRLEY Cardozo M.D., 4/19/2023 8:12 PM

## 2023-04-21 LAB
BACTERIA UR CULT: NORMAL
SIGNIFICANT IND 70042: NORMAL
SITE SITE: NORMAL
SOURCE SOURCE: NORMAL

## 2023-11-11 ENCOUNTER — HOSPITAL ENCOUNTER (EMERGENCY)
Facility: MEDICAL CENTER | Age: 33
End: 2023-11-11
Attending: OBSTETRICS & GYNECOLOGY | Admitting: OBSTETRICS & GYNECOLOGY
Payer: MEDICAID

## 2023-11-11 VITALS
BODY MASS INDEX: 23.3 KG/M2 | DIASTOLIC BLOOD PRESSURE: 71 MMHG | HEIGHT: 66 IN | HEART RATE: 98 BPM | TEMPERATURE: 97.1 F | RESPIRATION RATE: 16 BRPM | WEIGHT: 145 LBS | SYSTOLIC BLOOD PRESSURE: 117 MMHG | OXYGEN SATURATION: 95 %

## 2023-11-11 LAB
APPEARANCE UR: CLEAR
COLOR UR AUTO: YELLOW
GLUCOSE UR QL STRIP.AUTO: NEGATIVE MG/DL
KETONES UR QL STRIP.AUTO: NEGATIVE MG/DL
LEUKOCYTE ESTERASE UR QL STRIP.AUTO: ABNORMAL
NITRITE UR QL STRIP.AUTO: NEGATIVE
PH UR STRIP.AUTO: 7 [PH] (ref 5–8)
PROT UR QL STRIP: NEGATIVE MG/DL
RBC UR QL AUTO: NEGATIVE
SP GR UR STRIP.AUTO: 1.01 (ref 1–1.03)

## 2023-11-11 PROCEDURE — 59025 FETAL NON-STRESS TEST: CPT

## 2023-11-11 PROCEDURE — 76815 OB US LIMITED FETUS(S): CPT | Mod: 26 | Performed by: OBSTETRICS & GYNECOLOGY

## 2023-11-11 PROCEDURE — 81002 URINALYSIS NONAUTO W/O SCOPE: CPT

## 2023-11-11 PROCEDURE — 99282 EMERGENCY DEPT VISIT SF MDM: CPT | Mod: 25

## 2023-11-11 PROCEDURE — 99282 EMERGENCY DEPT VISIT SF MDM: CPT | Mod: 25 | Performed by: OBSTETRICS & GYNECOLOGY

## 2023-11-11 PROCEDURE — 59025 FETAL NON-STRESS TEST: CPT | Mod: 26 | Performed by: OBSTETRICS & GYNECOLOGY

## 2023-11-11 ASSESSMENT — FIBROSIS 4 INDEX: FIB4 SCORE: 0.72

## 2023-11-11 ASSESSMENT — PAIN SCALES - GENERAL: PAINLEVEL: 0 - NO PAIN

## 2023-11-11 NOTE — ED PROVIDER NOTES
Emergency Obstetric Consultation     Date of Service  2023    OB ED evaluation;    Kalli Jj is a 33 y.o. female  at 39w4d.  Patient presents complaining of pelvic burning pressure that started several hours ago.  She denies leakage of fluid she is having good fetal movement she denies contractions.  The patient has had prenatal care with Dr. Howe but left and moved to [place] at approximately 30 weeks gestation.  She is recently moved back but has not reestablished with any physician.      Patient Active Problem List    Diagnosis Date Noted    Vaginal discomfort 10/01/2021    Acne 10/01/2021    Severe episode of recurrent major depressive disorder, without psychotic features (HCC) 2021    Primary insomnia 2021       Review of systems; denies vaginal bleeding, leakage of fluid, uterine contractions, fever chills or abdominal pain  Past Medical History:   Diagnosis Date    Abnormal Pap smear, low grade squamous intraepithelial lesion (LGSIL) 2012    COVID-19     Depression      Past Surgical History:   Procedure Laterality Date    OTHER      breast lift     Patient has no known allergies.  Social History     Socioeconomic History    Marital status: Single     Spouse name: Not on file    Number of children: Not on file    Years of education: Not on file    Highest education level: Not on file   Occupational History    Not on file   Tobacco Use    Smoking status: Never    Smokeless tobacco: Never   Vaping Use    Vaping Use: Never used   Substance and Sexual Activity    Alcohol use: Not Currently     Comment: 1 drink per month    Drug use: No    Sexual activity: Not Currently     Partners: Male     Birth control/protection: None     Comment: not    Other Topics Concern    Not on file   Social History Narrative    Not on file     Social Determinants of Health     Financial Resource Strain: Not on file   Food Insecurity: Not on file   Transportation Needs: Not on  "file   Physical Activity: Not on file   Stress: Not on file   Social Connections: Not on file   Intimate Partner Violence: Not on file   Housing Stability: Not on file         Physical examination;  Alert and oriented x3  Gen.-well-developed well-nourished female in no apparent distress  HEENT-normocephalic, nontraumatic,EOMI,PERRLA  /71   Pulse 98   Temp 36.2 °C (97.1 °F) (Temporal)   Resp 16   Ht 1.676 m (5' 6\")   Wt 65.8 kg (145 lb)   LMP 02/07/2023 (Exact Date)   SpO2 95%   BMI 23.40 kg/m²   Skin is warm and dry  Back-negative for CVA tenderness  Cardiovascular-regular rate and rhythm, normal S1-S2 no murmurs gallops  Lungs-clear to auscultation bilaterally  Abdomen-nondistended positive bowel sounds soft nontender without masses or hepatosplenomegaly  Cervix-closed/50% effaced  Extremities without cyanosis clubbing or edema  Neurologic grossly intact    Labs;  Limited transabdominal ultrasound; as performed and read by myself; cephalic presentation anterior fundal placenta LIBRADO greater than 10, BPP 8/8    NST-as performed and read by myself; reactive NST without contractions    Impression;  IUP AT 39w4d  Not in labor  Noncompliant with prenatal care    Plan;  Patient needs to establish with OB provider follow-up within 1 week  Labor precautions reviewed with patient      Layo Borja MD    "

## 2023-11-11 NOTE — PROGRESS NOTES
EDC 2023 39w4d    Patient presents to L&D triage unit reporting pressure and burning in her pelvis that began at 9pm. Patient denies LOF/VB; states normal FM. EFM monitors applied and tracing. VSS. Call light at bedside, patient instructed on use. Questions answered at this time.    0353: Dr. Borja called report. MD to come to bedside.     0405: MD to bedside. U/S confirmed vertex position. SVE closed/50. Tracing reviewed. Discharge order received. Patient to call [facility] to set up appointment.    Labor precautions gone over with patient. Questions answered. Ambulated out in stable condition.

## 2023-11-13 ENCOUNTER — INITIAL PRENATAL (OUTPATIENT)
Dept: OBGYN | Facility: CLINIC | Age: 33
End: 2023-11-13
Payer: MEDICAID

## 2023-11-13 VITALS — DIASTOLIC BLOOD PRESSURE: 60 MMHG | BODY MASS INDEX: 26.37 KG/M2 | WEIGHT: 163.4 LBS | SYSTOLIC BLOOD PRESSURE: 104 MMHG

## 2023-11-13 DIAGNOSIS — Z34.80 SUPERVISION OF OTHER NORMAL PREGNANCY, ANTEPARTUM: ICD-10-CM

## 2023-11-13 PROCEDURE — 0500F INITIAL PRENATAL CARE VISIT: CPT | Performed by: NURSE PRACTITIONER

## 2023-11-13 PROCEDURE — 3074F SYST BP LT 130 MM HG: CPT | Performed by: NURSE PRACTITIONER

## 2023-11-13 PROCEDURE — 3078F DIAST BP <80 MM HG: CPT | Performed by: NURSE PRACTITIONER

## 2023-11-13 ASSESSMENT — EDINBURGH POSTNATAL DEPRESSION SCALE (EPDS)
THE THOUGHT OF HARMING MYSELF HAS OCCURRED TO ME: NEVER
THINGS HAVE BEEN GETTING ON TOP OF ME: NO, MOST OF THE TIME I HAVE COPED QUITE WELL
I HAVE BEEN SO UNHAPPY THAT I HAVE HAD DIFFICULTY SLEEPING: NOT AT ALL
TOTAL SCORE: 5
I HAVE BEEN ABLE TO LAUGH AND SEE THE FUNNY SIDE OF THINGS: AS MUCH AS I ALWAYS COULD
I HAVE FELT SCARED OR PANICKY FOR NO GOOD REASON: NO, NOT MUCH
I HAVE LOOKED FORWARD WITH ENJOYMENT TO THINGS: AS MUCH AS I EVER DID
I HAVE BEEN ANXIOUS OR WORRIED FOR NO GOOD REASON: YES, SOMETIMES
I HAVE FELT SAD OR MISERABLE: NOT VERY OFTEN
I HAVE BEEN SO UNHAPPY THAT I HAVE BEEN CRYING: NO, NEVER
I HAVE BLAMED MYSELF UNNECESSARILY WHEN THINGS WENT WRONG: NO, NEVER

## 2023-11-13 ASSESSMENT — FIBROSIS 4 INDEX: FIB4 SCORE: 0.72

## 2023-11-13 NOTE — LETTER
"Count Your Baby's Movements  Another step to a healthy delivery    Kalli Анна              Dept: [phone]    How Many Weeks Pregnant 39w6d    Date to Begin Countin2023              How to use this chart    One way for your physician to keep track of your baby's health is by knowing how often the baby moves (or \"kicks\") in your womb.  You can help your physician to do this by using this chart every day.    Every day, you should see how many hours it takes for your baby to move 10 times.  Start in the morning, as soon as you get up.    First, write down the time your baby moves until you get to 10.  Check off one box every time your baby moves until you get to 10.  Write down the time you finished counting in the last column.  Total how long it took to count up all 10 movements.  Finally, fill in the box that shows how long this took.  After counting 10 movements, you no longer have to count any more that day.  The next morning, just start counting again as soon as you get up.    What should you call a \"movement\"?  It is hard to say, because it will feel different from one mother to another and from one pregnancy to the next.  The important thing is that you count the movements the same way throughout your pregnancy.  If you have more questions, you should ask your physician.    Count carefully every day!  SAMPLE:  Week 28    How many hours did it take to feel 10 movements?       Start  Time     1     2     3     4     5     6     7     8     9     10   Finish Time   Mon 8:20           11:40                  Fri               Sat               Sun                 IMPORTANT: You should contact your physician if it takes more than two hours for you to feel 10 movements.  Each morning, write down the time and start to count the movements of your baby.  Keep track by checking off one box every time you feel one movement.  When you have felt 10 \"kicks\", write down the " time you finished counting in the last column.  Then fill in the   box (over the check francy) for the number of hours it took.  Be sure to read the complete instructions on the previous page.

## 2023-11-13 NOTE — PROGRESS NOTES
Subjective:   Kalli Jj is a 33 y.o.  who presents for her new OB exam.  She is 39w6d with an JOSE of Estimated Date of Delivery: 23 by LMP she was tracking . She is feeling well and has no concerns at this time. She was see ing Dr. Howe and until 30 weeks and then went to [facility] and got care there for one appt and US there and now is planning to deliver here. Denies VB, LOF, contractions or pain. No ER visits or previous care in this pregnancy. Denies dysuria, vaginal DC, fever. Reports good fetal movement. S/p genetic testing.     Past Medical History:   Diagnosis Date    Urinary tract infection        Psych Hx: Patient denies any history of depression, anxiety, PTSD, bipolar or any other psychological issues.      Past Surgical History:   Procedure Laterality Date    OTHER      breast lift        OB History    Para Term  AB Living   4 2 2   1 2   SAB IAB Ectopic Molar Multiple Live Births   0 1     0 2      # Outcome Date GA Lbr Paul/2nd Weight Sex Delivery Anes PTL Lv   4 Current            3 Term 18 41w0d 09:21 / 00:26 7 lb 8.8 oz M Vag-Spont EPI  MELVIN   2 IAB      TAB      1 Term 13 40w0d  6 lb 7 oz M Vag-Spont EPI  MELVIN      Birth Comments: Pt states no complications        Gynecological Hx: Denies any hx of STIs, including HSV. 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 Denies any vulvovaginal disorders, had an abnormal pap smear in , all normal since. Last pap normal .     Sexual Hx: One current male partner, who is  FOB     Contraceptive Hx: Has used BC pills in the past and has since discontinued use.     Family History   Problem Relation Age of Onset    Hypertension Mother     Cancer Mother         cervical    Diabetes Father     Hypertension Father     No Known Problems Brother     Alcohol/Drug Maternal Grandfather     Diabetes Paternal Grandmother     No Known Problems Paternal Grandfather      Denies any  genetic disorders in family history.     Social History     Socioeconomic History    Marital status: Single     Spouse name: Not on file    Number of children: Not on file    Years of education: Not on file    Highest education level: Not on file   Occupational History    Not on file   Tobacco Use    Smoking status: Never    Smokeless tobacco: Never   Vaping Use    Vaping Use: Never used   Substance and Sexual Activity    Alcohol use: Not Currently     Comment: 1 drink per month    Drug use: No    Sexual activity: Yes     Partners: Male     Birth control/protection: None     Comment: not    Other Topics Concern    Not on file   Social History Narrative    Not on file     Social Determinants of Health     Financial Resource Strain: Not on file   Food Insecurity: Not on file   Transportation Needs: Not on file   Physical Activity: Not on file   Stress: Not on file   Social Connections: Not on file   Intimate Partner Violence: Not on file   Housing Stability: Not on file       FORENA is involved and lives with Kalli Jj.  Pregnancy is unplanned but desired.    She is currently working at work from home, denies any heavy lifting or exposure to potential teratogens like environmental or occupational toxins.   Denies alcohol use, drug use, or tobacco use in pregnancy.   Denies any current or hx of sexual, emotional or physical abuse or trauma.     Current Medications: PNV  Allergies: Denies allergies to medications, food, or environmental allergies    Objective:      Vitals:    11/13/23 1143   BP: 104/60   Weight: 163 lb 6.4 oz        See Prenatal Physical and Prenatal Vitals    Prenatal Physical:  General Exam:  HEENT: normal    Heart: normal    Thyroid: normal    Lungs: normal    Lymph Nodes: normal    Breasts: normal    Neurological: normal    Abdomen: normal    Skin: normal    Extremities: normal    Pelvic Exam:  Uterus (wks):  28  Rectum:  Not assessed         Assessment:      1.  IUP @ 39w6d per  LMP      2.  S=D      3.  See problem list as follows       Patient Active Problem List    Diagnosis Date Noted    Supervision of other normal pregnancy, antepartum 11/13/2023         Plan:   - Gc/ct neg and pap records neg from 2022  - Declines flu vacc  - GBS collected   - Prenatal labs done  - Discussed PNV, nutrition, adequate water intake, and exercise/weight gain in pregnancy  - NOB informational packet with anticipatory guidance given  - Information on Centering Pregnancy given, n/a  - S/sx of pregnancy warning signs and PTL precautions given  - Complete OB US done  - Desires IOL closer to weekend, order placed today

## 2023-11-13 NOTE — PROGRESS NOTES
Pt. Here for NOB visit.  GBS today   # [phone]  LMP 2/7/2023  Pt states having [place] and pelvic pressure denies bleeding.   Last pap smear 2022 WNL per pt, Dr. Howe or Stephanie Ovalles  + FM, kick count sheet given along with instructions.   Flu vaccine offered and declined.   EPDS = 5

## 2023-11-19 ENCOUNTER — HOSPITAL ENCOUNTER (INPATIENT)
Facility: MEDICAL CENTER | Age: 33
LOS: 2 days | End: 2023-11-21
Attending: OBSTETRICS & GYNECOLOGY | Admitting: OBSTETRICS & GYNECOLOGY
Payer: MEDICAID

## 2023-11-19 ENCOUNTER — APPOINTMENT (OUTPATIENT)
Dept: OBGYN | Facility: MEDICAL CENTER | Age: 33
End: 2023-11-19
Attending: OBSTETRICS & GYNECOLOGY
Payer: MEDICAID

## 2023-11-19 PROCEDURE — 770002 HCHG ROOM/CARE - OB PRIVATE (112)

## 2023-11-19 RX ORDER — LIDOCAINE HYDROCHLORIDE 10 MG/ML
20 INJECTION, SOLUTION INFILTRATION; PERINEURAL
Status: DISCONTINUED | OUTPATIENT
Start: 2023-11-19 | End: 2023-11-20 | Stop reason: HOSPADM

## 2023-11-19 RX ORDER — ACETAMINOPHEN 500 MG
1000 TABLET ORAL
Status: COMPLETED | OUTPATIENT
Start: 2023-11-19 | End: 2023-11-20

## 2023-11-19 RX ORDER — SODIUM CHLORIDE, SODIUM LACTATE, POTASSIUM CHLORIDE, CALCIUM CHLORIDE 600; 310; 30; 20 MG/100ML; MG/100ML; MG/100ML; MG/100ML
INJECTION, SOLUTION INTRAVENOUS CONTINUOUS
Status: DISCONTINUED | OUTPATIENT
Start: 2023-11-19 | End: 2023-11-20

## 2023-11-19 RX ORDER — OXYTOCIN 10 [USP'U]/ML
10 INJECTION, SOLUTION INTRAMUSCULAR; INTRAVENOUS
Status: DISCONTINUED | OUTPATIENT
Start: 2023-11-19 | End: 2023-11-20 | Stop reason: HOSPADM

## 2023-11-19 RX ORDER — TERBUTALINE SULFATE 1 MG/ML
0.25 INJECTION, SOLUTION SUBCUTANEOUS
Status: DISCONTINUED | OUTPATIENT
Start: 2023-11-19 | End: 2023-11-20 | Stop reason: HOSPADM

## 2023-11-19 RX ORDER — IBUPROFEN 800 MG/1
800 TABLET ORAL
Status: COMPLETED | OUTPATIENT
Start: 2023-11-19 | End: 2023-11-20

## 2023-11-19 RX ORDER — MISOPROSTOL 200 UG/1
800 TABLET ORAL
Status: DISCONTINUED | OUTPATIENT
Start: 2023-11-19 | End: 2023-11-20 | Stop reason: HOSPADM

## 2023-11-19 ASSESSMENT — PAIN SCALES - GENERAL: PAINLEVEL: 0 - NO PAIN

## 2023-11-19 ASSESSMENT — FIBROSIS 4 INDEX: FIB4 SCORE: 0.72

## 2023-11-20 ENCOUNTER — ANESTHESIA EVENT (OUTPATIENT)
Dept: ANESTHESIOLOGY | Facility: MEDICAL CENTER | Age: 33
End: 2023-11-20
Payer: MEDICAID

## 2023-11-20 ENCOUNTER — ANESTHESIA (OUTPATIENT)
Dept: ANESTHESIOLOGY | Facility: MEDICAL CENTER | Age: 33
End: 2023-11-20
Payer: MEDICAID

## 2023-11-20 DIAGNOSIS — Z34.80 SUPERVISION OF OTHER NORMAL PREGNANCY, ANTEPARTUM: ICD-10-CM

## 2023-11-20 LAB
BASOPHILS # BLD AUTO: 0.3 % (ref 0–1.8)
BASOPHILS # BLD: 0.02 K/UL (ref 0–0.12)
EOSINOPHIL # BLD AUTO: 0.02 K/UL (ref 0–0.51)
EOSINOPHIL NFR BLD: 0.3 % (ref 0–6.9)
ERYTHROCYTE [DISTWIDTH] IN BLOOD BY AUTOMATED COUNT: 57.6 FL (ref 35.9–50)
HCT VFR BLD AUTO: 40.1 % (ref 37–47)
HGB BLD-MCNC: 13.2 G/DL (ref 12–16)
HOLDING TUBE BB 8507: NORMAL
IMM GRANULOCYTES # BLD AUTO: 0.08 K/UL (ref 0–0.11)
IMM GRANULOCYTES NFR BLD AUTO: 1 % (ref 0–0.9)
LYMPHOCYTES # BLD AUTO: 1.65 K/UL (ref 1–4.8)
LYMPHOCYTES NFR BLD: 21.1 % (ref 22–41)
MCH RBC QN AUTO: 28.3 PG (ref 27–33)
MCHC RBC AUTO-ENTMCNC: 32.9 G/DL (ref 32.2–35.5)
MCV RBC AUTO: 86.1 FL (ref 81.4–97.8)
MONOCYTES # BLD AUTO: 0.55 K/UL (ref 0–0.85)
MONOCYTES NFR BLD AUTO: 7 % (ref 0–13.4)
NEUTROPHILS # BLD AUTO: 5.5 K/UL (ref 1.82–7.42)
NEUTROPHILS NFR BLD: 70.3 % (ref 44–72)
NRBC # BLD AUTO: 0 K/UL
NRBC BLD-RTO: 0 /100 WBC (ref 0–0.2)
PLATELET # BLD AUTO: 181 K/UL (ref 164–446)
PMV BLD AUTO: 11.1 FL (ref 9–12.9)
RBC # BLD AUTO: 4.66 M/UL (ref 4.2–5.4)
T PALLIDUM AB SER QL IA: NORMAL
WBC # BLD AUTO: 7.8 K/UL (ref 4.8–10.8)

## 2023-11-20 PROCEDURE — 700111 HCHG RX REV CODE 636 W/ 250 OVERRIDE (IP): Mod: JZ | Performed by: PHYSICIAN ASSISTANT

## 2023-11-20 PROCEDURE — 700102 HCHG RX REV CODE 250 W/ 637 OVERRIDE(OP): Performed by: PHYSICIAN ASSISTANT

## 2023-11-20 PROCEDURE — 10907ZC DRAINAGE OF AMNIOTIC FLUID, THERAPEUTIC FROM PRODUCTS OF CONCEPTION, VIA NATURAL OR ARTIFICIAL OPENING: ICD-10-PCS | Performed by: STUDENT IN AN ORGANIZED HEALTH CARE EDUCATION/TRAINING PROGRAM

## 2023-11-20 PROCEDURE — 59409 OBSTETRICAL CARE: CPT | Performed by: STUDENT IN AN ORGANIZED HEALTH CARE EDUCATION/TRAINING PROGRAM

## 2023-11-20 PROCEDURE — 86780 TREPONEMA PALLIDUM: CPT

## 2023-11-20 PROCEDURE — 303615 HCHG EPIDURAL/SPINAL ANESTHESIA FOR LABOR

## 2023-11-20 PROCEDURE — 700111 HCHG RX REV CODE 636 W/ 250 OVERRIDE (IP): Mod: JZ | Performed by: ANESTHESIOLOGY

## 2023-11-20 PROCEDURE — 770002 HCHG ROOM/CARE - OB PRIVATE (112)

## 2023-11-20 PROCEDURE — A9270 NON-COVERED ITEM OR SERVICE: HCPCS | Performed by: PHYSICIAN ASSISTANT

## 2023-11-20 PROCEDURE — 700105 HCHG RX REV CODE 258: Performed by: ANESTHESIOLOGY

## 2023-11-20 PROCEDURE — 304965 HCHG RECOVERY SERVICES

## 2023-11-20 PROCEDURE — 59409 OBSTETRICAL CARE: CPT

## 2023-11-20 PROCEDURE — 85025 COMPLETE CBC W/AUTO DIFF WBC: CPT

## 2023-11-20 PROCEDURE — 700111 HCHG RX REV CODE 636 W/ 250 OVERRIDE (IP): Performed by: ANESTHESIOLOGY

## 2023-11-20 PROCEDURE — 36415 COLL VENOUS BLD VENIPUNCTURE: CPT

## 2023-11-20 PROCEDURE — 700101 HCHG RX REV CODE 250: Performed by: ANESTHESIOLOGY

## 2023-11-20 PROCEDURE — 700105 HCHG RX REV CODE 258: Performed by: PHYSICIAN ASSISTANT

## 2023-11-20 PROCEDURE — 3E033VJ INTRODUCTION OF OTHER HORMONE INTO PERIPHERAL VEIN, PERCUTANEOUS APPROACH: ICD-10-PCS | Performed by: STUDENT IN AN ORGANIZED HEALTH CARE EDUCATION/TRAINING PROGRAM

## 2023-11-20 RX ORDER — IBUPROFEN 800 MG/1
800 TABLET ORAL EVERY 8 HOURS PRN
Status: DISCONTINUED | OUTPATIENT
Start: 2023-11-20 | End: 2023-11-21 | Stop reason: HOSPADM

## 2023-11-20 RX ORDER — ACETAMINOPHEN 500 MG
500 TABLET ORAL ONCE
Status: COMPLETED | OUTPATIENT
Start: 2023-11-20 | End: 2023-11-20

## 2023-11-20 RX ORDER — FERROUS SULFATE 325(65) MG
325 TABLET ORAL DAILY
COMMUNITY

## 2023-11-20 RX ORDER — CALCIUM CARBONATE 500 MG/1
1000 TABLET, CHEWABLE ORAL EVERY 6 HOURS PRN
Status: DISCONTINUED | OUTPATIENT
Start: 2023-11-20 | End: 2023-11-21 | Stop reason: HOSPADM

## 2023-11-20 RX ORDER — BUPIVACAINE HYDROCHLORIDE 2.5 MG/ML
INJECTION, SOLUTION EPIDURAL; INFILTRATION; INTRACAUDAL
Status: COMPLETED | OUTPATIENT
Start: 2023-11-20 | End: 2023-11-20

## 2023-11-20 RX ORDER — SODIUM CHLORIDE, SODIUM LACTATE, POTASSIUM CHLORIDE, CALCIUM CHLORIDE 600; 310; 30; 20 MG/100ML; MG/100ML; MG/100ML; MG/100ML
INJECTION, SOLUTION INTRAVENOUS PRN
Status: DISCONTINUED | OUTPATIENT
Start: 2023-11-20 | End: 2023-11-21 | Stop reason: HOSPADM

## 2023-11-20 RX ORDER — SODIUM CHLORIDE, SODIUM LACTATE, POTASSIUM CHLORIDE, AND CALCIUM CHLORIDE .6; .31; .03; .02 G/100ML; G/100ML; G/100ML; G/100ML
1000 INJECTION, SOLUTION INTRAVENOUS
Status: COMPLETED | OUTPATIENT
Start: 2023-11-20 | End: 2023-11-20

## 2023-11-20 RX ORDER — FERROUS SULFATE 325(65) MG
325 TABLET ORAL DAILY
Status: DISCONTINUED | OUTPATIENT
Start: 2023-11-21 | End: 2023-11-21 | Stop reason: HOSPADM

## 2023-11-20 RX ORDER — VITAMIN A ACETATE, BETA CAROTENE, ASCORBIC ACID, CHOLECALCIFEROL, .ALPHA.-TOCOPHEROL ACETATE, DL-, THIAMINE MONONITRATE, RIBOFLAVIN, NIACINAMIDE, PYRIDOXINE HYDROCHLORIDE, FOLIC ACID, CYANOCOBALAMIN, CALCIUM CARBONATE, FERROUS FUMARATE, ZINC OXIDE, CUPRIC OXIDE 3080; 12; 120; 400; 1; 1.84; 3; 20; 22; 920; 25; 200; 27; 10; 2 [IU]/1; UG/1; MG/1; [IU]/1; MG/1; MG/1; MG/1; MG/1; MG/1; [IU]/1; MG/1; MG/1; MG/1; MG/1; MG/1
1 TABLET, FILM COATED ORAL
Status: DISCONTINUED | OUTPATIENT
Start: 2023-11-21 | End: 2023-11-21 | Stop reason: HOSPADM

## 2023-11-20 RX ORDER — LIDOCAINE HYDROCHLORIDE AND EPINEPHRINE 15; 5 MG/ML; UG/ML
INJECTION, SOLUTION EPIDURAL
Status: COMPLETED | OUTPATIENT
Start: 2023-11-20 | End: 2023-11-20

## 2023-11-20 RX ORDER — EPHEDRINE SULFATE 50 MG/ML
5 INJECTION, SOLUTION INTRAVENOUS
Status: DISCONTINUED | OUTPATIENT
Start: 2023-11-20 | End: 2023-11-20 | Stop reason: HOSPADM

## 2023-11-20 RX ORDER — ROPIVACAINE HYDROCHLORIDE 2 MG/ML
INJECTION, SOLUTION EPIDURAL; INFILTRATION; PERINEURAL CONTINUOUS
Status: DISCONTINUED | OUTPATIENT
Start: 2023-11-20 | End: 2023-11-20

## 2023-11-20 RX ORDER — SIMETHICONE 125 MG
125 TABLET,CHEWABLE ORAL 4 TIMES DAILY PRN
Status: DISCONTINUED | OUTPATIENT
Start: 2023-11-20 | End: 2023-11-21 | Stop reason: HOSPADM

## 2023-11-20 RX ORDER — DOCUSATE SODIUM 100 MG/1
100 CAPSULE, LIQUID FILLED ORAL 2 TIMES DAILY PRN
Status: DISCONTINUED | OUTPATIENT
Start: 2023-11-20 | End: 2023-11-21 | Stop reason: HOSPADM

## 2023-11-20 RX ORDER — ACETAMINOPHEN 500 MG
1000 TABLET ORAL EVERY 6 HOURS PRN
Status: DISCONTINUED | OUTPATIENT
Start: 2023-11-20 | End: 2023-11-21 | Stop reason: HOSPADM

## 2023-11-20 RX ORDER — BISACODYL 10 MG
10 SUPPOSITORY, RECTAL RECTAL PRN
Status: DISCONTINUED | OUTPATIENT
Start: 2023-11-20 | End: 2023-11-21 | Stop reason: HOSPADM

## 2023-11-20 RX ORDER — SODIUM CHLORIDE, SODIUM LACTATE, POTASSIUM CHLORIDE, AND CALCIUM CHLORIDE .6; .31; .03; .02 G/100ML; G/100ML; G/100ML; G/100ML
250 INJECTION, SOLUTION INTRAVENOUS PRN
Status: DISCONTINUED | OUTPATIENT
Start: 2023-11-20 | End: 2023-11-20 | Stop reason: HOSPADM

## 2023-11-20 RX ADMIN — IBUPROFEN 800 MG: 800 TABLET, FILM COATED ORAL at 20:53

## 2023-11-20 RX ADMIN — BUPIVACAINE HYDROCHLORIDE 8 ML: 2.5 INJECTION, SOLUTION EPIDURAL; INFILTRATION; INTRACAUDAL at 14:36

## 2023-11-20 RX ADMIN — ACETAMINOPHEN 1000 MG: 500 TABLET, FILM COATED ORAL at 20:53

## 2023-11-20 RX ADMIN — OXYTOCIN 125 ML/HR: 10 INJECTION, SOLUTION INTRAMUSCULAR; INTRAVENOUS at 21:38

## 2023-11-20 RX ADMIN — OXYTOCIN 2 MILLI-UNITS/MIN: 10 INJECTION, SOLUTION INTRAMUSCULAR; INTRAVENOUS at 02:24

## 2023-11-20 RX ADMIN — OXYTOCIN 20 UNITS: 10 INJECTION, SOLUTION INTRAMUSCULAR; INTRAVENOUS at 17:58

## 2023-11-20 RX ADMIN — OXYTOCIN 125 ML/HR: 10 INJECTION, SOLUTION INTRAMUSCULAR; INTRAVENOUS at 19:27

## 2023-11-20 RX ADMIN — SODIUM CHLORIDE, POTASSIUM CHLORIDE, SODIUM LACTATE AND CALCIUM CHLORIDE: 600; 310; 30; 20 INJECTION, SOLUTION INTRAVENOUS at 12:43

## 2023-11-20 RX ADMIN — LIDOCAINE HYDROCHLORIDE,EPINEPHRINE BITARTRATE 3 ML: 15; .005 INJECTION, SOLUTION EPIDURAL; INFILTRATION; INTRACAUDAL; PERINEURAL at 14:36

## 2023-11-20 RX ADMIN — ACETAMINOPHEN 500 MG: 500 TABLET, FILM COATED ORAL at 20:59

## 2023-11-20 RX ADMIN — SODIUM CHLORIDE, POTASSIUM CHLORIDE, SODIUM LACTATE AND CALCIUM CHLORIDE 1000 ML: 600; 310; 30; 20 INJECTION, SOLUTION INTRAVENOUS at 13:47

## 2023-11-20 RX ADMIN — SODIUM CHLORIDE, POTASSIUM CHLORIDE, SODIUM LACTATE AND CALCIUM CHLORIDE: 600; 310; 30; 20 INJECTION, SOLUTION INTRAVENOUS at 00:28

## 2023-11-20 RX ADMIN — ROPIVACAINE HYDROCHLORIDE: 2 INJECTION, SOLUTION EPIDURAL; INFILTRATION at 14:40

## 2023-11-20 ASSESSMENT — PATIENT HEALTH QUESTIONNAIRE - PHQ9
2. FEELING DOWN, DEPRESSED, IRRITABLE, OR HOPELESS: NOT AT ALL
SUM OF ALL RESPONSES TO PHQ9 QUESTIONS 1 AND 2: 0
1. LITTLE INTEREST OR PLEASURE IN DOING THINGS: NOT AT ALL

## 2023-11-20 ASSESSMENT — LIFESTYLE VARIABLES
ALCOHOL_USE: NO
TOTAL SCORE: 0
DOES PATIENT WANT TO STOP DRINKING: NO
TOTAL SCORE: 0
HAVE PEOPLE ANNOYED YOU BY CRITICIZING YOUR DRINKING: NO
CONSUMPTION TOTAL: INCOMPLETE
EVER_SMOKED: NEVER
EVER HAD A DRINK FIRST THING IN THE MORNING TO STEADY YOUR NERVES TO GET RID OF A HANGOVER: NO
EVER FELT BAD OR GUILTY ABOUT YOUR DRINKING: NO
HAVE YOU EVER FELT YOU SHOULD CUT DOWN ON YOUR DRINKING: NO
TOTAL SCORE: 0

## 2023-11-20 ASSESSMENT — PAIN DESCRIPTION - PAIN TYPE
TYPE: ACUTE PAIN

## 2023-11-20 ASSESSMENT — PAIN SCALES - GENERAL: PAIN_LEVEL: 0

## 2023-11-20 NOTE — ANESTHESIA PREPROCEDURE EVALUATION
Date: 23  Procedure: Labor Epidural         Relevant Problems   No relevant active problems      in active labor.     Full term.     Shah.       Physical Exam    Airway   Mallampati: II  TM distance: >3 FB  Neck ROM: full       Cardiovascular - normal exam  Rhythm: regular  Rate: normal  (-) murmur     Dental - normal exam           Pulmonary - normal exam  Breath sounds clear to auscultation     Abdominal    Neurological - normal exam                   Anesthesia Plan    ASA 2       Plan - epidural   Neuraxial block will be labor analgesia                  Pertinent diagnostic labs and testing reviewed    Informed Consent:    Anesthetic plan and risks discussed with patient.

## 2023-11-20 NOTE — ANESTHESIA PROCEDURE NOTES
Epidural Block    Date/Time: 11/20/2023 2:36 PM    Performed by: Francisco Javier Galarza M.D.  Authorized by: Francisco Javier Galarza M.D.    Patient Location:  OB  Start Time:  11/20/2023 2:36 PM  Reason for Block: labor analgesia    patient identified, IV checked, site marked, risks and benefits discussed, surgical consent, monitors and equipment checked and pre-op evaluation    Patient Position:  Sitting  Prep: ChloraPrep, patient draped and sterile technique    Monitoring:  Blood pressure, continuous pulse oximetry and heart rate  Approach:  Midline  Location:  L3-L4  Injection Technique:  DENNIS saline  Skin infiltration:  Lidocaine  Strength:  1%  Dose:  3ml  Needle Type:  Tuohy  Needle Gauge:  17 G  Needle Length:  3.5 in  Loss of resistance::  7  Catheter Size:  19 G  Catheter at Skin Depth:  15  Test Dose Result:  Negative

## 2023-11-20 NOTE — PROGRESS NOTES
- report received from ANDREA Becker. , 39/, admitted due to PD. [place]/ FHM applied. IV started, labs drawn, POC discussed.  - MD wants us to start PIT but patient prefers the gel, she indicated PIT doesn't work for her.    - Discussed POC with Tamsen and patient.  - plans to start with Cyto. All agreeable.  0045- pt appears to be char too often to placed cyto, will continue to monitor prior to placement.  0049- prolonged decel present.  Will continue to monitor patient prior to starting any medication.  - widwife notified.  - midwife at bedside to eval uterus.  0215- Discussed POC with midwife, orders to start PIT 1x1.  0400- Discussed POC with SWETHA Clayton. Discussed POC, discussed possible cooks cath.  0410- SVE: /-3, cervical change remains unchanged. Discussed cooks balloon with patient.  All agreeable. Discussed risks and benefits.  0420- placed cooks cath.  1255- report given to ANDREA Garg

## 2023-11-20 NOTE — PROGRESS NOTES
Report received, assessment done. Pt is comfortable. POC was discussed with pt. Pain management was discussed with pt. Also positioning was discussed.pt has no questions at this time.  0930 bright light exam was done by Dr Gonzalez, no lesions seen.. spoke to pt, pt denies having a lesion at any time of her life.  1305 the cook balloon is out, SVE done 6 cm, pt wants epidural before SROM. DR Galarza was notified.  1348 DR Galarza in spoke to pt, hydrating for epidural. DR Galarza will be back.  1427 Dr Galarza in, Harsha Singh student in  Epidural was placed.  1737 test dose given, tolerated well.  1506 Dr Galarza in for low BPs, ephedrine given.  1515 DR Gonzalez in AROM, clear, 7 cm.  1627 pt report pressure, SVE done complete,instructions given in pushing.1634 pushing started.  1742 female viable baby delivered,  by DR Gonzalez with 8-9 apgars.  1900 report given to night shift ANDREA Husain

## 2023-11-20 NOTE — CARE PLAN
The patient is Stable - Low risk of patient condition declining or worsening    Shift Goals  Clinical Goals: healthy mom, healthy baby  Patient Goals: cervical change, pain management  Family Goals: emotional support    Progress made toward(s) clinical / shift goals:    Problem: Knowledge Deficit - L&D  Goal: Patient and family/caregivers will demonstrate understanding of plan of care, disease process/condition, diagnostic tests and medications  Outcome: Progressing     Problem: Psychosocial - L&D  Goal: Patient's level of anxiety will decrease  Outcome: Progressing  Goal: Patient will be able to discuss coping skills during hospitalization  Outcome: Progressing  Note: POC discussed, patient Involved in decision making  Goal: Patient's ability to re-evaluate and adapt role responsibilities will improve  Outcome: Progressing  Goal: Spiritual and cultural needs incorporated into hospitalization  Outcome: Progressing     Problem: Risk for Venous Thromboembolism (VTE)  Goal: VTE prevention measures will be implemented and patient will remain free from VTE  Outcome: Progressing     Problem: Risk for Infection and Impaired Wound Healing  Goal: Patient will remain free from infection  Outcome: Progressing     Problem: Risk for Fluid Imbalance  Goal: Patient's fluid volume balance will be maintained or improve  Outcome: Progressing  Note: Currently on IV fluids     Problem: Risk for Injury  Goal: Patient and fetus will be free of preventable injury/complications  Outcome: Progressing     Problem: Pain  Goal: Patient's pain will be alleviated or reduced to the patient’s comfort goal  Outcome: Progressing  Flowsheets (Taken 11/20/2023 0149)  Pain Rating Scale (NPRS): 1  OB Pain Level: 1-Coping  Note: Plans to get epidural when she is in active labor, patient will ask for it when ready     Problem: Discharge Barriers/Planning  Goal: Patient's continuum of care needs are met  Outcome: Progressing       Patient is not  progressing towards the following goals: N/A

## 2023-11-20 NOTE — PROGRESS NOTES
Labor Progress Note:      S:  Serum antibodies positive for HSV-1, drawn in   Pt denies history of genital outbreak or cold sore. She currently denies fevers, chills, tingling or burning sensation, vesicles, or lesions.    Vitals:    23 0400 23 0645 23 0656 23 0701   BP: 99/58  120/70 117/59   Pulse: 75  89 88   Resp:    16   Temp: 36.2 °C (97.2 °F) 36.1 °C (97 °F)  36.6 °C (97.8 °F)   TempSrc: Temporal Temporal  Temporal   SpO2:       Weight:       Height:           Cook's in place    Vulva and inferior vagina (distal to cook's) inspected under bright light with no vesicles or lesions noted.    FHT: 120/moderate variability/accelerations present/no decels  toco: ctx Q3-4 minutes      A/P: 33 y.o.  @ 40w6d by LMP admitted for IOL to prevent post-dates pregnancy.  - labor: early labor, cook's in place and pitocin at 8 clara-units per minute  - FHT: cat 1 for intermittent   - GBS: negative  -  Rh +, RI    Yasmine Gonzalez M.D.

## 2023-11-20 NOTE — PROGRESS NOTES
Labor Progress Note:      S:  Pt reports she is more comfortable with epidural.    Vitals:    23 1453 23 1458 23 1503 23 1508   BP: (!) 80/48 100/59 102/63 115/73   Pulse: 93 78 86 (!) 50   Resp:       Temp:       TempSrc:       SpO2: 98% 94% 97% 98%   Weight:       Height:           SVE: 7/80/-2  AROM with return of clear fluid and some bloody show    FHT: 125/moderate variability/accelerations present/early decels  toco:  ctx Q2-3 minutes      A/P: 33 y.o.  @ 40w6d by LMP admitted for IOL to prevent post-dates pregnancy  - labor: active  - FHT: cat 1  - GBS: negative  -  Rh +, RI    Yasmine Gonzalez M.D.

## 2023-11-20 NOTE — PROGRESS NOTES
"S: Kalli is a 32 y/o  here for IOL d/t post dates. Reports feeling overall well, feeling occasional contractions; denies LOF, reports good FM.      O: BP 99/58   Pulse 75   Temp 36.2 °C (97.2 °F) (Temporal)   Resp 16   Ht 1.676 m (5' 6\")   Wt 73.9 kg (163 lb)   LMP 2023 (Exact Date)   SpO2 95%   BMI 26.31 kg/m²            FHTs:  Baseline 125, variability: moderate, accels: present, decels: absent        [place]: Contractions q 4-7        SVE: 2/50/-3     A/P:    1.  IUP @ 40w6d  2.  Cat 1 FHTs    3.  Cook's catheter placed for mechanical ripening with 60mL in each balloon  4.  Continue Pitocin per protocol  5.  Anticipate     Matilde Warner CNM  "

## 2023-11-20 NOTE — H&P
History and Physical    Kalli Jj is a 33 y.o. female  -Para:     Gestational Age:  40w5d  Admitted for:   Induction of Labor - pt seen in triage for OP gel, will stay for IOL  Admitted to  [facility].  Patient received prenatal care: with Dr Howe until 30 wk, then rachelle some care in [place] then transferred to [facility] for one visit.     HPI: Patient is admitted with the above mentioned Chief Complaint and States   Loss of fluid:   negative  Abdominal Pain:  negative  Uterine Contractions:  negative  Vaginal Bleeding:  negative  Fetal Movement:  normal  Patient denies fever, chills, nausea, vomiting , headache, visual disturbance, or dysuria  Patient's last menstrual period was 2023 (exact date).  Estimated Date of Delivery: 23  Final JOSE: 2023, by Last Menstrual Period    Patient Active Problem List    Diagnosis Date Noted    Supervision of other normal pregnancy, antepartum 2023       Admitting DX: Pregnancy   Pregnancy Complications:  Intermittent prenatal care - all labs wnl  OB Risk Factors:   none  Labor State:    Not in labor.    History:   has a past medical history of Urinary tract infection.     has a past surgical history that includes other ().    OB History    Para Term  AB Living   4 2 2   1 2   SAB IAB Ectopic Molar Multiple Live Births   0 1     0 2      # Outcome Date GA Lbr Paul/2nd Weight Sex Delivery Anes PTL Lv   4 Current            3 Term 18 41w0d 09:21 / 00:26 3.425 kg (7 lb 8.8 oz) M Vag-Spont EPI  MELVIN   2 IAB 2014     TAB      1 Term 13 40w0d  2.92 kg (6 lb 7 oz) M Vag-Spont EPI  MELVIN      Birth Comments: Pt states no complications       Medications:  No current facility-administered medications on file prior to encounter.     No current outpatient medications on file prior to encounter.       Allergies:  Patient has no known allergies.    ROS:   Neuro: negative    Cardiovascular:  "negative  Gastro intestinal: negative  Genitourinary: negative            Physical Exam:  /67   Pulse 77   Temp 36.3 °C (97.3 °F) (Temporal)   Resp 16   Ht 1.676 m (5' 6\")   Wt 73.9 kg (163 lb)   LMP 02/07/2023 (Exact Date)   SpO2 95%   BMI 26.31 kg/m²   Constitutional: healthy-appearing, Well-developed, well-nourished  No JVD: while supine  HEENT: PERRLA  Breast Exam: negative  Cardio: regular rate and rhythm  Lung: unlabored respirations, no intercostal retractions or accessory muscle use  Abdomen: abdomen is soft without significant tenderness, masses, organomegaly or guarding  Extremity: extremities, peripheral pulses and reflexes normal    Cervical Exam: 50%  Cervix Dilatation: 2  Station: negative 3  Pelvis: Normal  Fetal Assessment: Fetal heart variability: moderate  Fetal Heart Rate decelerations: none  Fetal Heart Rate accelerations: yes  Baseline FHR: 110 per minute  Uterine contractions: none  Estimated Fetal Weight: 3000 - 3500g      Labs:      Prenatal Results       General (Most Recent Result)       Test Value Date Time    ABO  O  11/28/12 1517    Rh  POS  11/28/12 1517    Antibody screen  NEG  11/28/12 1517    HbA1c ^ 5.3 % of total Hgb 08/09/23 1424    Chlamydia by PCR  Negative  07/28/22 1731    Gonorrhea by PCR  Negative  07/28/22 1731    RPR/Syphilus       HSV 1/2 by PCR (non-serum)       HSV 1/2 (serum)  >22.40 IV 09/13/22 1403    HSV 1  15.10 IV 09/13/22 1403    HSV 2  0.19 IV 09/13/22 1403    HPV (16)  Negative  05/25/22 1109    HBsAg  Negative  11/28/12 1515    HIV-1 HIV-2 Antibodies       Rubella  31.60 IU/mL 11/28/12 1515    Tb                 Pap Smear (Most Recent Result)       Test Value Date Time    Pap smear       Pap smear w/HPV  (See Report)   10/01/21 1435    Pap smear w/CTNG       Pap smar w/HPV CTNG  (See Report)   05/25/22 1109    Pap smear (reflex HPV ACUS)       Pap smear (reflex HPV ASCUS w/CTNG)       Pathology gyn specimen  (See Report)   05/25/22 1109         "      Urinalysis (Most Recent Result)       Test Value Date Time    Urinalysis  (See Report)   03/23/23 2230    POC urinalysis  (See Report)   07/02/18 1540    Urine drug screen (w/ conf)       Urine culture ([id number])  (See Report)   04/19/23 1848    Urine Protein/Creatinine Ratio                 Urinalysis, Culture if indicated       Test Value Date Time    Color  Orange  04/19/23 1848    Appearance  Cloudy  04/19/23 1848    Specific Gravity  1.025  04/19/23 1848    PH  5.5  04/19/23 1848    Glucose  see below mg/dL 04/19/23 1848    Ketones  see below mg/dL 04/19/23 1848    Protein  see below mg/dL 04/19/23 1848    Bilirubin  Negative  04/19/23 1848    Nitrites  Positive  04/19/23 1848    Leukocytes Esterase  Trace  04/19/23 1848    Blood  Negative  04/19/23 1848    Comment  Microscopic  04/19/23 1848    Culture  Yes UA Culture 11/28/12 1515              Urine Drug Screen       Test Value Date Time    Amphetamines       Barbiturates       Benzodiazepines       Cocaine       Methadone       Opiates       Oxycodone       Phencylidine       Propoxyphene       Marijuana Metabolite                 1st Trimester       Test Value Date Time    Hgb  10.3 g/dL 03/23/23 2301    Hct  33.8 % 03/23/23 2301    Fasting Glucose Tolerance       GTT, 1 hour       GTT, 2 hours       GTT, 3 hours                 2nd Trimester       Test Value Date Time    Hgb       Hct       AST       ALT       Uric Acid       Fasting Glucose Tolerance       GTT, 1 hour       GTT, 2 hours       GTT, 3 hours                 3rd Trimester       Test Value Date Time    Hgb       Hct       Platelet count       GBS (CANO BROTH)       Fasting Glucose Tolerance       GTT, 1 hour       GTT, 2 hous       GTT, 3hours                 Congenital Disease Screening       Test Value Date Time    First Trimester Screen       Quad Screen       BH Electrophoresis       Cystic Fibrosis Carrier Study       SMA       AFP Maternal Serum        AFP Tetra  (See Report)    12 1054    NIPT                 Legend    ^: Historical                              Assessment:  Gestational Age:  40w5d  Labor State:   Labor, Active  Risk Factors:   none  Pregnancy Complications: none    Patient Active Problem List    Diagnosis Date Noted    Supervision of other normal pregnancy, antepartum 2023       Plan:   Admitted for: Induction of Labor, pt was here for OP Gel - plan to admit and augment/induce for post dates, GBS neg, pain control prn, augment with pitocin and AROM. Anticipate .     JACQUELINE Mabry.

## 2023-11-20 NOTE — CARE PLAN
The patient is Stable - Low risk of patient condition declining or worsening    Shift Goals  Clinical Goals: healthy mom, healthy baby  Patient Goals: cervical change, pain management  Family Goals: emotional support    Progress made toward(s) clinical / shift goals:    Problem: Knowledge Deficit - L&D  Goal: Patient and family/caregivers will demonstrate understanding of plan of care, disease process/condition, diagnostic tests and medications  Outcome: Progressing  Note: POC was discussed and questions were answered. Will discuss further as they come up.     Problem: Pain  Goal: Patient's pain will be alleviated or reduced to the patient’s comfort goal  Flowsheets (Taken 11/20/2023 7131)  Pain Rating Scale (NPRS): 1  OB Pain Level: 1-Coping  OB Pain Intervention: Education  Note: Epidural and timing of it was discussed.       Patient is not progressing towards the following goals:

## 2023-11-20 NOTE — PROGRESS NOTES
EDC 2023 40w5d    Patient presents to L&D triage unit for scheduled OP gel. Patient denies contractions/LOF/VB; states normal FM. EFM monitors applied and tracing. VSS. Call light at bedside, patient instructed on use. Questions answered at this time.    2213: SVE /3.    Report to Hemant COLEMAN. Patient will be admitted.    : Report to Kranthi MENDEZ.

## 2023-11-21 VITALS
WEIGHT: 163 LBS | RESPIRATION RATE: 17 BRPM | BODY MASS INDEX: 26.2 KG/M2 | SYSTOLIC BLOOD PRESSURE: 101 MMHG | OXYGEN SATURATION: 96 % | DIASTOLIC BLOOD PRESSURE: 59 MMHG | HEART RATE: 60 BPM | TEMPERATURE: 97.1 F | HEIGHT: 66 IN

## 2023-11-21 LAB
ERYTHROCYTE [DISTWIDTH] IN BLOOD BY AUTOMATED COUNT: 56 FL (ref 35.9–50)
HCT VFR BLD AUTO: 33 % (ref 37–47)
HGB BLD-MCNC: 10.9 G/DL (ref 12–16)
MCH RBC QN AUTO: 28.1 PG (ref 27–33)
MCHC RBC AUTO-ENTMCNC: 33 G/DL (ref 32.2–35.5)
MCV RBC AUTO: 85.1 FL (ref 81.4–97.8)
PLATELET # BLD AUTO: 144 K/UL (ref 164–446)
PMV BLD AUTO: 10.7 FL (ref 9–12.9)
RBC # BLD AUTO: 3.88 M/UL (ref 4.2–5.4)
WBC # BLD AUTO: 11.5 K/UL (ref 4.8–10.8)

## 2023-11-21 PROCEDURE — A9270 NON-COVERED ITEM OR SERVICE: HCPCS | Performed by: STUDENT IN AN ORGANIZED HEALTH CARE EDUCATION/TRAINING PROGRAM

## 2023-11-21 PROCEDURE — 36415 COLL VENOUS BLD VENIPUNCTURE: CPT

## 2023-11-21 PROCEDURE — 700102 HCHG RX REV CODE 250 W/ 637 OVERRIDE(OP): Performed by: STUDENT IN AN ORGANIZED HEALTH CARE EDUCATION/TRAINING PROGRAM

## 2023-11-21 PROCEDURE — 85027 COMPLETE CBC AUTOMATED: CPT

## 2023-11-21 RX ORDER — IBUPROFEN 800 MG/1
800 TABLET ORAL EVERY 8 HOURS PRN
Qty: 30 TABLET | Refills: 0 | Status: SHIPPED | OUTPATIENT
Start: 2023-11-21

## 2023-11-21 RX ORDER — PSEUDOEPHEDRINE HCL 30 MG
100 TABLET ORAL 2 TIMES DAILY PRN
Qty: 60 CAPSULE | Refills: 0 | Status: SHIPPED | OUTPATIENT
Start: 2023-11-21

## 2023-11-21 RX ORDER — ACETAMINOPHEN 500 MG
1000 TABLET ORAL EVERY 6 HOURS PRN
Qty: 30 TABLET | Refills: 0 | Status: SHIPPED | OUTPATIENT
Start: 2023-11-21

## 2023-11-21 RX ADMIN — PRENATAL WITH FERROUS FUM AND FOLIC ACID 1 TABLET: 3080; 920; 120; 400; 22; 1.84; 3; 20; 10; 1; 12; 200; 27; 25; 2 TABLET ORAL at 08:57

## 2023-11-21 RX ADMIN — FERROUS SULFATE TAB 325 MG (65 MG ELEMENTAL FE) 325 MG: 325 (65 FE) TAB at 05:59

## 2023-11-21 ASSESSMENT — EDINBURGH POSTNATAL DEPRESSION SCALE (EPDS)
I HAVE FELT SAD OR MISERABLE: NO, NOT AT ALL
I HAVE BEEN SO UNHAPPY THAT I HAVE BEEN CRYING: ONLY OCCASIONALLY
I HAVE BEEN ANXIOUS OR WORRIED FOR NO GOOD REASON: HARDLY EVER
I HAVE LOOKED FORWARD WITH ENJOYMENT TO THINGS: AS MUCH AS I EVER DID
I HAVE BEEN SO UNHAPPY THAT I HAVE HAD DIFFICULTY SLEEPING: NOT AT ALL
I HAVE BLAMED MYSELF UNNECESSARILY WHEN THINGS WENT WRONG: NOT VERY OFTEN
THE THOUGHT OF HARMING MYSELF HAS OCCURRED TO ME: NEVER
THINGS HAVE BEEN GETTING ON TOP OF ME: NO, I HAVE BEEN COPING AS WELL AS EVER
I HAVE FELT SCARED OR PANICKY FOR NO GOOD REASON: NO, NOT AT ALL
I HAVE BEEN ABLE TO LAUGH AND SEE THE FUNNY SIDE OF THINGS: AS MUCH AS I ALWAYS COULD

## 2023-11-21 NOTE — ANESTHESIA POSTPROCEDURE EVALUATION
Patient: Kalli Jj    Procedure Summary       Date: 11/20/23 Room / Location:     Anesthesia Start: 1428 Anesthesia Stop: 1742    Procedure: Labor Epidural Diagnosis:     Scheduled Providers:  Responsible Provider: Francisco Javier Galarza M.D.    Anesthesia Type: epidural ASA Status: 2            Final Anesthesia Type: epidural  Last vitals  BP   Blood Pressure: 101/59    Temp   36.7 °C (98 °F)    Pulse   71   Resp   18    SpO2   96 %      Anesthesia Post Evaluation    Patient location during evaluation: PACU  Patient participation: complete - patient participated  Level of consciousness: awake and alert  Pain score: 0    Airway patency: patent  Anesthetic complications: no  Cardiovascular status: hemodynamically stable  Respiratory status: acceptable  Hydration status: euvolemic    PONV: none          No notable events documented.     Nurse Pain Score: 1 (NPRS)          
Patient information on fall and injury prevention

## 2023-11-21 NOTE — L&D DELIVERY NOTE
Vaginal Delivery Procedure Note:    Kalli Jj is a 33 y.o. GBS negative , now Para 3013 admitted for IOL to prevent post-dates pregnancy.  She was induced with cook's catheter, pitocin, and AROM and progressed appropriately through first stage of labor.     PreDelivery Diagnosis:  1. SIUP @ 40w6d    PostDelivery Diagnosis:  1. S/p       Procedure in Detail:  Patient began pushing in the dorsal lithotomy position.  The head delivered easily over an intact perineum in the OA position.  The left anterior shoulder followed easily with gentle downwards pressure followed by the posterior shoulder and body.  There was a tight nuchal cord x1 reduced with somersault maneuver. Terminal meconium noted with delivery of infant body.     Infant was placed on the maternal abdomen and was stimulated and bulb suctioned.  Cord clamping was delayed x60 seconds then the cord was clamped x2 and cut.  Infant APGARs 8 and 9 and 1 and 5 minutes, respectively.  Placenta delivered spontaneously intact with 3 vessel cord.  The vagina and perineum were examined revealing a small hemostatic abrasion of posterior vaginal wall not requiring repair. The uterus was firm with IV pitocin and fundal massage.  Patient and infant left bonding in LDR.      Anesthesia - epidural  Sponge and needle counts correct.  Patient tolerated procedure well.    Anticipate routine postparum care.    Yasmine Gonzalez M.D.

## 2023-11-21 NOTE — CARE PLAN
The patient is Stable - Low risk of patient condition declining or worsening    Shift Goals  Clinical Goals: fundus and lochia WNL, pain management, ambulate  Patient Goals: pain management, rest  Family Goals: MAKAYLA    Progress made toward(s) clinical / shift goals: Fundus firm, lochia light. Pain managed with prn pain medications per MAR, pt denies pain throughout shift. Pt ambulates with steady gait. Pt resting.    Problem: Pain - Standard  Goal: Alleviation of pain or a reduction in pain to the patient’s comfort goal  Outcome: Progressing     Problem: Altered Physiologic Condition  Goal: Patient physiologically stable as evidenced by normal lochia, palpable uterine involution and vitals within normal limits  Outcome: Progressing     Patient is not progressing towards the following goals: NA

## 2023-11-21 NOTE — PROGRESS NOTES
Pt arrived to S312 via wheelchair with L&D RN. Report received from ANDREA Nicole. Assessment completed. Pt declines pain interventions at this time. Pt oriented to room, call light, infant security, emergency light, and unit routine. Encouraged to call for assistance.

## 2023-11-21 NOTE — CARE PLAN
Problem: Knowledge Deficit - Postpartum  Goal: Patient will verbalize and demonstrate understanding of self and infant care  Outcome: Progressing     Problem: Psychosocial - Postpartum  Goal: Patient will verbalize and demonstrate effective bonding and parenting behavior  Outcome: Progressing   The patient is Stable - Low risk of patient condition declining or worsening    Shift Goals  Clinical Goals: fundus and lochia WNL, pain management, ambulate  Patient Goals: pain management, rest  Family Goals: MAKAYLA    Progress made toward(s) clinical / shift goals:  Patient is bonding with     Patient is not progressing towards the following goals:

## 2023-11-21 NOTE — PROGRESS NOTES
1900 report received from Britany MENDEZ    2100 pt up to bathroom, able to void    2110 pt transferred via wheelchair with infant in arms to PP room s312    2115 report given to PP ANDREA Beck

## 2023-11-21 NOTE — ANESTHESIA TIME REPORT
Anesthesia Start and Stop Event Times       Date Time Event    11/20/2023 1428 Anesthesia Start     1448 Ready for Procedure     1742 Anesthesia Stop          Responsible Staff  11/20/23      Name Role Begin End    Francisco Javier Galarza M.D. Anesth 1428 1742          Overtime Reason:  no overtime (within assigned shift)    Comments:

## 2023-11-21 NOTE — DISCHARGE INSTRUCTIONS
Pelvic rest x6 weeks  Return for follow up visit in 5-6 weeks.  Call or come to ED for: heavy vaginal bleeding, fever >100.4, severe abdominal pain, severe headache, chest pain, shortness of breath,  N/V, or other concerns.  PATIENT DISCHARGE EDUCATION INSTRUCTION SHEET    REASONS TO CALL YOUR OBSTETRICIAN  Persistent fever, shaking, chills (Temperature higher than 100.4) may indicate you have an infection  Heavy bleeding: soaking more than 1 pad per hour; Passing clots an egg-sized clot or bigger may mean you have an postpartum hemorrhage  Foul odor from vagina or bad smelling or discolored discharge or blood  Breast infection (Mastitis symptoms); breast pain, chills, fever, redness or red streaks, may feel flu like symptoms  Urinary pain, burning or frequency  Incision that is not healing, increased redness, swelling, tenderness or pain, or any pus from episiotomy or  site may mean you have an infection  Redness, swelling, warmth, or painful to touch in the calf area of your leg may mean you have a blood clot  Severe or intensified depression, thoughts or feelings of wanting to hurt yourself or someone else   Pain in chest, obstructed breathing or shortness of breath (trouble catching your breath) may mean you are having a postpartum complication. Call your provider immediately   Headache that does not get better, even after taking medicine, a bad headache with vision changes or pain in the upper right area of your belly may mean you have high blood pressure or post birth preeclampsia. Call your provider immediately    HAND WASHING  All family and friends should wash their hands:  Before and after holding the baby  Before feeding the baby  After using the restroom or changing the baby's diaper    WOUND CARE  Ask your physician for additional care instructions. In general:   Incision:  May shower and pat incision dry   Keep the incision clean and dry  There should not be any opening or pus from  the incision  Continue to walk at home 3 times a day   Do NOT lift anything heavier than your baby (over 10 pounds)  Encourage family to help participate in care of the  to allow rest and mom time to heal  Episiotomy/Laceration  May use trinh-spray bottle, witch hazel pads and dermaplast spray for comfort  Use trinh-spray bottle after urinating to cleanse perineal area  To prevent burning during urination spray trinh-water bottle on labial area   Pat perineal area dry until episiotomy/laceration is healed  Continue to use trinh-bottle until bleeding stops as needed  If have a 2nd degree laceration or greater, a Sitz bath can offer relief from soreness, burning, and inflammation   Sitz Bath   Sit in 6 inches of warm water and soak laceration as needed until the laceration heals    VAGINAL CARE AND BLEEDING  Nothing inside vagina for 6 weeks:   No sexual intercourse, tampons or douching  Bleeding may continue for 2-4 weeks. Amount and color may vary  Soaking 1 pad or more in an hour for several hours is considered heavy bleeding  Passing large egg sized blood clots can be concerning  If you feel like you have heavy bleeding or are having increasing amount of blood clots call your Obstetrician immediately  If you begin feeling faint upon standing, feeling sick to your stomach, have clammy skin, a really fast heartbeat, have chills, start feeling confused, dizzy, sleepy or weak, or feeling like you're going to faint call your Obstetrician immediately    HYPERTENSION   Preeclampsia or gestational hypertension are types of high blood pressure that only pregnant women can get. It is important for you to be aware of symptoms to seek early intervention and treatment. If you have any of these symptoms immediately call your Obstetrician    Vision changes or blurred vision   Severe headache or pain that is unrelieved with medication and will not go away  Persistent pain in upper abdomen or shoulder   Increased swelling of  "face, feet, or hands  Difficulty breathing or shortness of breath at rest  Urinating less than usual    URINATION AND BOWEL MOVEMENTS  Eating more fiber (bran cereal, fruits, and vegetables) and drinking plenty of fluids will help to avoid constipation  Urinary frequency and urgency after childbirth is normal  If you experience any urinary pain, burning or frequency call your provider    BIRTH CONTROL  It is possible to become pregnant at any time after delivery and while breastfeeding  Plan to discuss a method of birth control with your physician at your post delivery follow up visit    POSTPARTUM BLUES  During the first few days after birth, you may experience a sense of the \"blues\" which may include impatience, irritability or even crying. These feelings come and go quickly. However, as many as 1 in 10 women experience emotional symptoms known as postpartum depression.     POSTPARTUM DEPRESSION    May start as early as the second or third day after delivery or take several weeks or months to develop. Symptoms of \"blues\" are present, but are more intense: Crying spells; loss of appetite; feelings of hopelessness or loss of control; fear of touching the baby; over concern or no concern at all about the baby; little or no concern about your own appearance/caring for yourself; and/or inability to sleep or excessive sleeping. Contact your Obstetrician if you are experiencing any of these symptoms     PREVENTING SHAKEN BABY  If you are angry or stressed, PUT THE BABY IN THE CRIB, step away, take some deep breaths, and wait until you are calm to care for the baby. DO NOT SHAKE THE BABY. You are not alone, call a supporter for help.  [facility] 24/7 crisis call line ([phone]) or (1-586.506.3129)  You can also text them, text \"ANSWER\" (456114)  "

## 2023-11-21 NOTE — DISCHARGE SUMMARY
Discharge Summary:     Date of Admission: 2023  Date of Discharge: 23      Admitting diagnosis:    1. Pregnancy @ 40w6d      Discharge Diagnosis:   1. Status post vaginal, spontaneous.  2. Small hemostatic abrasion of posterior vaginal wall not requiring repair     Past Medical History:   Diagnosis Date    Urinary tract infection      OB History    Para Term  AB Living   4 3 3   1 3   SAB IAB Ectopic Molar Multiple Live Births   0 1     0 3      # Outcome Date GA Lbr Paul/2nd Weight Sex Delivery Anes PTL Lv   4 Term 23 40w6d 07:57 / 01:15 3.08 kg (6 lb 12.6 oz) F Vag-Spont EPI N MELVIN   3 Term 18 41w0d 09:21 / 00:26 3.425 kg (7 lb 8.8 oz) M Vag-Spont EPI  MELVIN   2 IAB      TAB      1 Term 13 40w0d  2.92 kg (6 lb 7 oz) M Vag-Spont EPI  MELVIN      Birth Comments: Pt states no complications     Past Surgical History:   Procedure Laterality Date    OTHER      breast lift     Patient has no known allergies.    Patient Active Problem List   Diagnosis    Supervision of other normal pregnancy, antepartum    Indication for care in labor or delivery    Labor and delivery, indication for care       Hospital Course:   Pt is a 33 y.o. now  who presented for delivery of term infant. The delivery was uncomplicated aside of tight nuchal cord that was reduced; . Epidural anesthesia was well tolerated. The was a small abrasion of the posterior vaginal wall that did no require repair. The morning after delivery Kalli felt well. Her hemoglobin fell from 13.2 to 10.9. She denies lightheadedness, headache, significant vaginal discharge, edema, chest pain, SOB. She is planning to bottle feed her child. She is eating, drinking, voiding and passing gas. She has been able to ambulate. She would like to avoid having another child and discussed were contraception options including the mini pill/progestin pill and depot shot. She displayed interest in both of these options and  would like some time decide on one. A discharge order has been placed for 11/21.       Physical Exam:  Temp:  [36 °C (96.8 °F)-36.9 °C (98.4 °F)] 36.2 °C (97.1 °F)  Pulse:  [] 60  Resp:  [16-18] 17  BP: ()/() 101/59  SpO2:  [94 %-100 %] 96 %  Physical Exam  General: well  Abdomen: normal bowel sounds, soft  Fundus: firm and below umbilicus  Incision: not applicable, (vaginal delivery)  Perineum: deferred  Extremities: symmetric, calves nontender  CV: RRR  RESP: CTAB    Current Facility-Administered Medications   Medication Dose    ferrous sulfate tablet 325 mg  325 mg    lactated ringers infusion      docusate sodium (Colace) capsule 100 mg  100 mg    ibuprofen (Motrin) tablet 800 mg  800 mg    acetaminophen (Tylenol) tablet 1,000 mg  1,000 mg    tetanus-dipth-acell pertussis (Tdap) inj 0.5 mL  0.5 mL    measles, mumps and rubella vaccine (Mmr) injection 0.5 mL  0.5 mL    PRN oxytocin (PITOCIN) (20 Units/1000 mL) PRN for excessive uterine bleeding - See Admin Instr  125-999 mL/hr    bisacodyl (Dulcolax) suppository 10 mg  10 mg    magnesium hydroxide (Milk Of Magnesia) suspension 30 mL  30 mL    prenatal plus vitamin (Stuartnatal 1+1) 27-1 MG tablet 1 Tablet  1 Tablet    simethicone (Mylicon) chewable tablet 125 mg  125 mg    calcium carbonate (Tums) chewable tab 1,000 mg  1,000 mg       Recent Labs     11/20/23  0010 11/21/23  0449   WBC 7.8 11.5*   RBC 4.66 3.88*   HEMOGLOBIN 13.2 10.9*   HEMATOCRIT 40.1 33.0*   MCV 86.1 85.1   MCH 28.3 28.1   MCHC 32.9 33.0   RDW 57.6* 56.0*   PLATELETCT 181 144*   MPV 11.1 10.7         Activity/ Discharge Instructions::   Discharge to home  Pelvic Rest x 6 weeks  No heavy lifting x4 weeks  Call or come to ED for: heavy vaginal bleeding, fever >100.4, severe abdominal pain, severe headache, chest pain, shortness of breath,  N/V, incisional drainage, or other concerns.       Follow up:  [facility] in 5 weeks for vaginal delivery; 1 week for incision  check for  delivery.     Discharge Meds:   Current Outpatient Medications   Medication Sig Dispense Refill    acetaminophen (TYLENOL) 500 MG Tab Take 2 Tablets by mouth every 6 hours as needed for Moderate Pain or Mild Pain. 30 Tablet 0    docusate sodium 100 MG Cap Take 100 mg by mouth 2 times a day as needed for Constipation. 60 Capsule 0    ibuprofen (MOTRIN) 800 MG Tab Take 1 Tablet by mouth every 8 hours as needed for Moderate Pain or Mild Pain. 30 Tablet 0       Meng Beltre M.D.  PGY-1  [facility]

## 2023-11-22 NOTE — PROGRESS NOTES
Discharge education reviewed and follow up instructions discussed.  Bands checked, cuddles removed, car seat checked.  Couplet left with father of the baby via hospital escort.Discharge education reviewed and follow up instructions discussed.  Bands checked, cuddles removed, car seat checked.  Couplet left with father of the baby via hospital escort.

## 2024-04-05 ENCOUNTER — OFFICE VISIT (OUTPATIENT)
Dept: MEDICAL GROUP | Facility: MEDICAL CENTER | Age: 34
End: 2024-04-05
Attending: INTERNAL MEDICINE
Payer: MEDICAID

## 2024-04-05 VITALS
BODY MASS INDEX: 25.55 KG/M2 | TEMPERATURE: 97.1 F | HEIGHT: 66 IN | SYSTOLIC BLOOD PRESSURE: 100 MMHG | DIASTOLIC BLOOD PRESSURE: 60 MMHG | WEIGHT: 159 LBS | OXYGEN SATURATION: 97 % | RESPIRATION RATE: 16 BRPM | HEART RATE: 80 BPM

## 2024-04-05 DIAGNOSIS — L72.3 SEBACEOUS CYST: ICD-10-CM

## 2024-04-05 DIAGNOSIS — H60.92 OTITIS EXTERNA OF LEFT EAR, UNSPECIFIED CHRONICITY, UNSPECIFIED TYPE: ICD-10-CM

## 2024-04-05 PROBLEM — Z34.80 SUPERVISION OF OTHER NORMAL PREGNANCY, ANTEPARTUM: Status: RESOLVED | Noted: 2023-11-13 | Resolved: 2024-04-05

## 2024-04-05 PROCEDURE — 99212 OFFICE O/P EST SF 10 MIN: CPT | Performed by: INTERNAL MEDICINE

## 2024-04-05 PROCEDURE — 3074F SYST BP LT 130 MM HG: CPT | Performed by: INTERNAL MEDICINE

## 2024-04-05 PROCEDURE — 3078F DIAST BP <80 MM HG: CPT | Performed by: INTERNAL MEDICINE

## 2024-04-05 PROCEDURE — 99214 OFFICE O/P EST MOD 30 MIN: CPT | Performed by: INTERNAL MEDICINE

## 2024-04-05 RX ORDER — HYDROCORTISONE AND ACETIC ACID 20.75; 10.375 MG/ML; MG/ML
5 SOLUTION AURICULAR (OTIC) 4 TIMES DAILY
Qty: 10 ML | Refills: 0 | Status: SHIPPED | OUTPATIENT
Start: 2024-04-05 | End: 2024-04-12

## 2024-04-05 ASSESSMENT — PATIENT HEALTH QUESTIONNAIRE - PHQ9: CLINICAL INTERPRETATION OF PHQ2 SCORE: 0

## 2024-04-05 ASSESSMENT — FIBROSIS 4 INDEX: FIB4 SCORE: 1.19

## 2024-04-05 NOTE — PROGRESS NOTES
Subjective:   Kalli Jj is a 34 y.o. female here today for bump in bikini area, ear drainage    Sebaceous cyst  Has noticed a small raised lump in the groin region initially about 5 months ago.  Resolved with warm compress but returned 1 month ago.  Not painful, no redness or drainage.  Feels soft.    Otitis externa of left ear  Has had drainage from the left ear off and on for 6 months, associated with itching.  Drainage mostly on q tip, foul smelling.  Denies ear pain, hearing loss.  Uncomfortable to lay on her left ear.       Current medicines (including changes today)  Current Outpatient Medications   Medication Sig Dispense Refill    acetic acid-hydrocortisone (VOSOL-HC) 1-2 % Solution Administer 5 Drops into the left ear 4 times a day for 7 days. 10 mL 0    ibuprofen (MOTRIN) 800 MG Tab Take 1 Tablet by mouth every 8 hours as needed for Moderate Pain or Mild Pain. 30 Tablet 0    ferrous sulfate 325 (65 Fe) MG tablet Take 325 mg by mouth every day.       No current facility-administered medications for this visit.     She  has a past medical history of Urinary tract infection.         Objective:     Vitals:    04/05/24 1540   BP: 100/60   Pulse: 80   Resp: 16   Temp: 36.2 °C (97.1 °F)   SpO2: 97%     Body mass index is 25.68 kg/m².   Physical Exam:  Constitutional: Alert, no distress.  Skin: Warm, dry, good turgor, approx 5 mm x 3 mm papule in left perineal area without surrounding warmth or erythema.  Eye: Equal, round and reactive, conjunctiva clear, lids normal.  ENMT: R TM clear, L auditory canal with mild amount of white exudate, no tenderness with manipulation of external ear, TM intact      Assessment and Plan:   The following treatment plan was discussed    1. Otitis externa of left ear, unspecified chronicity, unspecified type  Consistent with mild somewhat chronic otitis externa.  We discussed eardrops as below for the next week.  If no improvement in symptoms she will follow-up  - acetic  acid-hydrocortisone (VOSOL-HC) 1-2 % Solution; Administer 5 Drops into the left ear 4 times a day for 7 days.  Dispense: 10 mL; Refill: 0    2. Sebaceous cyst  Does not appear infected.  We discussed warm compress, signs symptoms of infection and to return if these occur.        Followup: Return if symptoms worsen or fail to improve.

## 2024-04-05 NOTE — ASSESSMENT & PLAN NOTE
Has noticed a small raised lump in the groin region initially about 5 months ago.  Resolved with warm compress but returned 1 month ago.  Not painful, no redness or drainage.  Feels soft.

## 2024-04-05 NOTE — ASSESSMENT & PLAN NOTE
Has had drainage from the left ear off and on for 6 months, associated with itching.  Drainage mostly on q tip, foul smelling.  Denies ear pain, hearing loss.  Uncomfortable to lay on her left ear.

## 2024-05-02 ENCOUNTER — PATIENT MESSAGE (OUTPATIENT)
Dept: MEDICAL GROUP | Facility: MEDICAL CENTER | Age: 34
End: 2024-05-02
Payer: MEDICAID

## 2024-05-03 ENCOUNTER — PATIENT MESSAGE (OUTPATIENT)
Dept: MEDICAL GROUP | Facility: MEDICAL CENTER | Age: 34
End: 2024-05-03
Payer: MEDICAID

## 2024-05-03 DIAGNOSIS — L29.3 PERINEAL IRRITATION: ICD-10-CM

## 2024-05-04 ENCOUNTER — HOSPITAL ENCOUNTER (EMERGENCY)
Facility: MEDICAL CENTER | Age: 34
End: 2024-05-04
Attending: EMERGENCY MEDICINE
Payer: MEDICAID

## 2024-05-04 VITALS
SYSTOLIC BLOOD PRESSURE: 115 MMHG | RESPIRATION RATE: 18 BRPM | OXYGEN SATURATION: 97 % | DIASTOLIC BLOOD PRESSURE: 84 MMHG | TEMPERATURE: 97.5 F | BODY MASS INDEX: 25.05 KG/M2 | WEIGHT: 155.87 LBS | HEART RATE: 95 BPM | HEIGHT: 66 IN

## 2024-05-04 DIAGNOSIS — H10.33 ACUTE CONJUNCTIVITIS OF BOTH EYES, UNSPECIFIED ACUTE CONJUNCTIVITIS TYPE: ICD-10-CM

## 2024-05-04 RX ORDER — ERYTHROMYCIN 5 MG/G
1 OINTMENT OPHTHALMIC
Qty: 3.5 G | Refills: 0 | Status: ACTIVE | OUTPATIENT
Start: 2024-05-04 | End: 2024-05-10

## 2024-05-04 ASSESSMENT — FIBROSIS 4 INDEX: FIB4 SCORE: 1.19

## 2024-05-04 NOTE — ED PROVIDER NOTES
"ER Provider Note    Scribed for Kaz Loza D.O. by Anam Aguilar. 5/4/2024  1:02 PM    Primary Care Provider: Stephanie Ovalles M.D.    CHIEF COMPLAINT  Chief Complaint   Patient presents with    Red Eye     Bilat with drainage \"stuck together when I woke up\".     HPI/ROS    LIMITATION TO HISTORY   None    Kalli Jj is a 34 y.o. female who presents to the Emergency Department for red eye onset 2 days ago. She states that when she left her sons football game on Thursday she noticed redness, but thought it was due to the grass field causing allergies. However her eyes then began to be puffy and had drainage. She also notes \"crusting\" around the eyes. Shortly after her symptoms started she \"ripped out\" her lash extensions. She denies any runny nose, has had drainage in the throat. She does not wear glasses or contacts.     ROS as per HPI.    PAST MEDICAL HISTORY  Past Medical History:   Diagnosis Date    Urinary tract infection        SURGICAL HISTORY  Past Surgical History:   Procedure Laterality Date    OTHER 2020    breast lift       FAMILY HISTORY  Family History   Problem Relation Age of Onset    Hypertension Mother     Cancer Mother         cervical    Diabetes Father     Hypertension Father     No Known Problems Brother     Alcohol/Drug Maternal Grandfather     Diabetes Paternal Grandmother     No Known Problems Paternal Grandfather        SOCIAL HISTORY   reports that she has never smoked. She has never used smokeless tobacco. She reports that she does not currently use alcohol. She reports that she does not use drugs.    CURRENT MEDICATIONS  Discharge Medication List as of 5/4/2024  1:19 PM        CONTINUE these medications which have NOT CHANGED    Details   ibuprofen (MOTRIN) 800 MG Tab Take 1 Tablet by mouth every 8 hours as needed for Moderate Pain or Mild Pain., Disp-30 Tablet, R-0, Normal      ferrous sulfate 325 (65 Fe) MG tablet Take 325 mg by mouth every day., Historical Med    " "         ALLERGIES  Patient has no known allergies.    PHYSICAL EXAM  /88   Pulse (!) 101   Temp 36.7 °C (98 °F) (Temporal)   Resp 18   Ht 1.676 m (5' 6\")   Wt 70.7 kg (155 lb 13.8 oz)   LMP 04/20/2024 (Approximate)   SpO2 96%   BMI 25.16 kg/m²     General: No acute distress.  Eyes: Bilateral conjunctivitis, no crusting at this time.   HENT: Normocephalic, Mucus membranes are moist.   Neuro: Awake, Conversive, Able to relay recent events.  Psychiatric: Calm and cooperative.     INITIAL ASSESSMENT  Patient has bilateral eye crusting and erythema, consistent with conjunctivitis. She had lash extensions in when this started which is concerning for infectious conjunctivitis. Visual acuity is normal, she does not wear contact lenses, will treat with antibiotics.     ED Observation Status? No; Patient does not meet criteria for ED Observation.     COURSE & MEDICAL DECISION MAKING     COURSE AND PLAN  1:02 PM - Patient seen and examined at bedside. Discussed plan of care, including antibiotic eye drops for treatment. Patient agrees to the plan of care. The patient will be medicated with erythromycin 5 mg ointment. Patient will now be discharged at this time. Discussed return precautions and plan for at home care. Patient verbalizes understanding and agreement to this plan of care.       ED Summary: This patient has bilateral conjunctivitis.  She did have artificial lashes in place which is a nidus for infection.  She will be placed on antibiotic ointment.  Her visual acuity is normal, she does not wear contact lenses    Decision tools and prescription drugs considered including, but not limited to: antibiotics: started on erythromycin    DISPOSITION AND DISCUSSIONS  I have discussed management of the patient with the following physicians and ROCCO's: None    Discussion of management with other QHP or appropriate source(s): None    Barriers to care at this time, including but not limited to: " None    DISPOSITION:  Patient will be discharged home in stable condition.    FOLLOW UP:  Stephanie Ovalles M.D.  [address] [id number] [phone]    In 1 week        OUTPATIENT MEDICATIONS:  Discharge Medication List as of 5/4/2024  1:19 PM        START taking these medications    Details   erythromycin 5 MG/GM Ointment Apply 1 Application to both eyes at bedtime., Disp-3.5 g, R-0, Normal            FINAL DIAGNOSIS  1. Acute conjunctivitis of both eyes, unspecified acute conjunctivitis type        I, Anam Aguilar (Johnnaibcarey), am scribing for, and in the presence of, Kaz Lzoa D.O..    Electronically signed by: Anam Aguilar (Gail), 5/4/2024    IKaz D.O. personally performed the services described in this documentation, as scribed by Anam Aguilar in my presence, and it is both accurate and complete.     The note accurately reflects work and decisions made by me.  Kaz Loza D.O.  5/4/2024  4:08 PM

## 2024-05-04 NOTE — DISCHARGE INSTRUCTIONS
Use antibiotic ointment as prescribed, return if symptoms change or worsen typically with the antibiotics about 2 days this will significantly improved.

## 2024-05-04 NOTE — ED TRIAGE NOTES
"Chief Complaint   Patient presents with    Red Eye     Bilat with drainage \"stuck together when I woke up\".     Physical Exam  Pulmonary:      Effort: Pulmonary effort is normal.   Skin:     General: Skin is warm and dry.   Neurological:      Mental Status: She is alert.         "

## 2024-05-04 NOTE — ED NOTES
Visual acuity test performed with results of right eye 20/25, left eye 20/25, and both eyes 20/20

## 2024-05-04 NOTE — ED NOTES
ERP at bedside. Pt agrees with plan of care discussed by ERP. Kathleen in low position, side rail up for pt safety. Call light within reach. Plan of care on-going

## 2024-05-10 ENCOUNTER — OFFICE VISIT (OUTPATIENT)
Dept: MEDICAL GROUP | Facility: MEDICAL CENTER | Age: 34
End: 2024-05-10
Attending: INTERNAL MEDICINE
Payer: MEDICAID

## 2024-05-10 ENCOUNTER — HOSPITAL ENCOUNTER (OUTPATIENT)
Facility: MEDICAL CENTER | Age: 34
End: 2024-05-10
Attending: INTERNAL MEDICINE
Payer: MEDICAID

## 2024-05-10 VITALS
TEMPERATURE: 97.1 F | OXYGEN SATURATION: 96 % | HEART RATE: 90 BPM | BODY MASS INDEX: 25.07 KG/M2 | RESPIRATION RATE: 16 BRPM | HEIGHT: 66 IN | WEIGHT: 156 LBS | DIASTOLIC BLOOD PRESSURE: 62 MMHG | SYSTOLIC BLOOD PRESSURE: 100 MMHG

## 2024-05-10 DIAGNOSIS — N89.8 VAGINAL IRRITATION: ICD-10-CM

## 2024-05-10 PROCEDURE — 99213 OFFICE O/P EST LOW 20 MIN: CPT | Performed by: INTERNAL MEDICINE

## 2024-05-10 PROCEDURE — 3078F DIAST BP <80 MM HG: CPT | Performed by: INTERNAL MEDICINE

## 2024-05-10 PROCEDURE — 3074F SYST BP LT 130 MM HG: CPT | Performed by: INTERNAL MEDICINE

## 2024-05-10 ASSESSMENT — FIBROSIS 4 INDEX: FIB4 SCORE: 1.19

## 2024-05-10 NOTE — PROGRESS NOTES
Subjective:   Kalli Jj is a 34 y.o. female here today for vaginal irritaiton    Vaginal irritation  She continues to complain of persistent vaginal and perineal irritation.  For the past few weeks she has noticed a little bit of pelvic pain associated with this and some intermittent discharge although less than previous.  She is wondering if she might have low estrogen level however she does report having a regular menstrual cycle with periods each month.  She is also wondering if she might have bladder prolapse but she does not have any urinary symptoms.  She was seen by gynecology (Dr. Howe) and reports having a biopsy done to rule out lichen sclerosus.  She states that she was told the biopsy came back normal.  She was also given a prescription for a topical corticosteroid previously but she was concerned about the side effects and never picked it up or tried it.  She reports a generalized feeling of discomfort with sometimes redness and swelling of the labia.  She is not sure if she has developed any rashes but thinks maybe she has.  Symptoms initially started when she was not sexually active.  She is now having some dyspareunia as well but reports this has always been somewhat of an issue for her.  It seems to be more pronounced lately.  Of note, she felt a lot better when she was pregnant and symptoms returned after a few months postpartum.      Current medicines (including changes today)  Current Outpatient Medications   Medication Sig Dispense Refill    ferrous sulfate 325 (65 Fe) MG tablet Take 325 mg by mouth every day.       No current facility-administered medications for this visit.     She  has a past medical history of Urinary tract infection.         Objective:     Vitals:    05/10/24 1454   BP: 100/62   Pulse: 90   Resp: 16   Temp: 36.2 °C (97.1 °F)   SpO2: 96%     Body mass index is 25.19 kg/m².   Physical Exam:  Constitutional: Alert, no distress.  Skin: Warm, dry, good turgor, no  rashes in visible areas.  Eye: Equal, round and reactive, conjunctiva clear, lids normal.  Psych: Alert and oriented x3, normal affect and mood.      Assessment and Plan:   The following treatment plan was discussed    1. Vaginal irritation  Etiology is unclear.  we discussed that her referral to dermatology has been approved and she can call to make an appointment for further evaluation.  Given the change in her discharge with the mild associated pelvic pain, we discussed obtaining vaginal pathogen testing as below.  -Follow-up with dermatology and gynecology  - VAGINAL PATHOGENS DNA PANEL; Future  - Chlamydia/GC, PCR (Genital/Anal swab); Future        Followup: Return if symptoms worsen or fail to improve.

## 2024-05-10 NOTE — ASSESSMENT & PLAN NOTE
She continues to complain of persistent vaginal and perineal irritation.  For the past few weeks she has noticed a little bit of pelvic pain associated with this and some intermittent discharge although less than previous.  She is wondering if she might have low estrogen level however she does report having a regular menstrual cycle with periods each month.  She is also wondering if she might have bladder prolapse but she does not have any urinary symptoms.  She was seen by gynecology (Dr. Howe) and reports having a biopsy done to rule out lichen sclerosus.  She states that she was told the biopsy came back normal.  She was also given a prescription for a topical corticosteroid previously but she was concerned about the side effects and never picked it up or tried it.  She reports a generalized feeling of discomfort with sometimes redness and swelling of the labia.  She is not sure if she has developed any rashes but thinks maybe she has.  Symptoms initially started when she was not sexually active.  She is now having some dyspareunia as well but reports this has always been somewhat of an issue for her.  It seems to be more pronounced lately.

## 2024-05-11 LAB
C TRACH DNA GENITAL QL NAA+PROBE: NEGATIVE
CANDIDA DNA VAG QL PROBE+SIG AMP: NEGATIVE
G VAGINALIS DNA VAG QL PROBE+SIG AMP: NEGATIVE
N GONORRHOEA DNA GENITAL QL NAA+PROBE: NEGATIVE
SPECIMEN SOURCE: NORMAL
T VAGINALIS DNA VAG QL PROBE+SIG AMP: NEGATIVE

## 2024-08-26 ENCOUNTER — HOSPITAL ENCOUNTER (EMERGENCY)
Facility: MEDICAL CENTER | Age: 34
End: 2024-08-26
Attending: EMERGENCY MEDICINE
Payer: MEDICAID

## 2024-08-26 VITALS
OXYGEN SATURATION: 94 % | RESPIRATION RATE: 15 BRPM | BODY MASS INDEX: 22.68 KG/M2 | SYSTOLIC BLOOD PRESSURE: 129 MMHG | DIASTOLIC BLOOD PRESSURE: 79 MMHG | TEMPERATURE: 97.2 F | HEART RATE: 76 BPM | WEIGHT: 141.09 LBS | HEIGHT: 66 IN

## 2024-08-26 DIAGNOSIS — N76.0 ACUTE VAGINITIS: ICD-10-CM

## 2024-08-26 LAB
APPEARANCE UR: CLEAR
BACTERIA GENITAL QL WET PREP: NORMAL
BILIRUB UR QL STRIP.AUTO: NEGATIVE
COLOR UR: YELLOW
GLUCOSE UR STRIP.AUTO-MCNC: NEGATIVE MG/DL
HCG UR QL: NEGATIVE
KETONES UR STRIP.AUTO-MCNC: NEGATIVE MG/DL
LEUKOCYTE ESTERASE UR QL STRIP.AUTO: NEGATIVE
MICRO URNS: NORMAL
NITRITE UR QL STRIP.AUTO: NEGATIVE
PH UR STRIP.AUTO: 5.5 [PH] (ref 5–8)
PROT UR QL STRIP: NEGATIVE MG/DL
RBC UR QL AUTO: NEGATIVE
SIGNIFICANT IND 70042: NORMAL
SITE SITE: NORMAL
SOURCE SOURCE: NORMAL
SP GR UR STRIP.AUTO: >=1.03

## 2024-08-26 PROCEDURE — 81025 URINE PREGNANCY TEST: CPT

## 2024-08-26 PROCEDURE — 87491 CHLMYD TRACH DNA AMP PROBE: CPT

## 2024-08-26 PROCEDURE — 87591 N.GONORRHOEAE DNA AMP PROB: CPT

## 2024-08-26 PROCEDURE — 99284 EMERGENCY DEPT VISIT MOD MDM: CPT

## 2024-08-26 PROCEDURE — 81003 URINALYSIS AUTO W/O SCOPE: CPT

## 2024-08-26 PROCEDURE — 87086 URINE CULTURE/COLONY COUNT: CPT

## 2024-08-26 RX ORDER — CLINDAMYCIN PHOSPHATE 20 MG/G
1 CREAM VAGINAL
COMMUNITY

## 2024-08-26 ASSESSMENT — FIBROSIS 4 INDEX: FIB4 SCORE: 1.19

## 2024-08-26 NOTE — ED NOTES
Pt was set for discharge and had left the ER without notifying ER staff. ERP aware. Pt had not received AVS paperwork however ERP has discussed plan of discharge with pt to followup with PCP and Gyn outpatient.

## 2024-08-26 NOTE — ED TRIAGE NOTES
"Chief Complaint   Patient presents with    Painful Urination     X 1 week       /79   Pulse 76   Temp 36.2 °C (97.2 °F) (Temporal)   Resp 15   Ht 1.676 m (5' 6\")   Wt 64 kg (141 lb 1.5 oz)   LMP 07/01/2024 (Approximate)   SpO2 94%   BMI 22.77 kg/m²     Pt ambulated to ED w/ visitor for c/o possible UTI r/t dysuria and lower abd pain x 1 week.    "

## 2024-08-26 NOTE — ED NOTES
Medication history reviewed with pt. Med rec is complete.  Allergies reviewed, per pt    Patient has had  outpatient antibiotics in the last 30 days, pt used her prescription from 7/2024 of CLINDAMYCIN cream 2 nights ago.  Pt thought it would help with her infection.    Pt is not on any anticoagulants

## 2024-08-26 NOTE — ED NOTES
"PT AO4, sitting up at edge of [facility], no acute distress noted. She has c/o painful urination x 1 week, denies any N/V fevers, CVA tenderness.  She states she has been dealing with intermittent yeast infections for the last 3 years, and about a month ago she was seen by her dermatologists who completed and external swab that came back as \"positive for a bacteria and not yeast and she gave me a clindamycin cream to apply.\" Pt used this cream daily for 2 weeks, last dose about a week ago. She reports still having some vaginal itching but no discharge or abnormal smell.   UA collected and sent to lab.   "

## 2024-08-26 NOTE — ED NOTES
Pelvic exam completed By Dr Cooper [facility] tech present w during exam and sent vaginal swabs to lab.

## 2024-08-26 NOTE — DISCHARGE INSTRUCTIONS
Follow-up with your primary care physician and your gynecologist this coming week.  Please call for appointment.      Return to the ER for any worsening vaginal irritation, abnormal vaginal discharge, worsening pain with urination, cloudy or foul-smelling urine, blood in urine, abnormal vaginal bleeding, abdominal pain, back pain, fevers over 100.4, shaking chills, nausea, vomiting, or for any concerns.    At this time urine culture as well as chlamydia and gonorrhea  results are pending.  Your results will be posted to [employer] in the next few days.  Your primary care physician can also follow-up on your results.

## 2024-08-26 NOTE — ED PROVIDER NOTES
"ED Provider Note    CHIEF COMPLAINT  Chief Complaint   Patient presents with    Painful Urination     X 1 week         EXTERNAL RECORDS REVIEWED  Outpatient Notes patient presented to her primary care doctor's office on May 10, 2024 with complaint of persistent vaginal and perineal irritation.  She also reported some intermittent discharge.  It was reported during that visit that she saw her gynecologist and had a biopsy done to rule out lichen sclerosis.  The biopsy came back normal.  She had STD testing done during that visit which all came back negative.  Patient was referred to dermatology for her vaginal irritation.    HPI/ROS  LIMITATION TO HISTORY   Select: : None  OUTSIDE HISTORIAN(S):  none    Kalli Jj is a 34 y.o. female who presents to the ER complaining of vaginal irritation over the last 1 week.  Several days ago she started having pain with urination.  No abdominal pain.  No back pain.  No abnormal vaginal discharge.  She does not think she is at risk for STDs but she states \"you never know.\"  She had a little bit of abnormal vaginal bleeding with sex last week, but otherwise no abnormal vaginal bleeding.  She is not currently on her menstrual cycle.  No increased frequency or urgency of urination.  No cloudy or foul-smelling urine.  No fevers or chills.  She says she has been struggling with some vaginal irritation on and off for the last few years and back in May of this year she was seen by gynecology and dermatology and was ruled out for the lichen sclerosis.  She said she was told she had a bacterial infection and was put on clindamycin gel with improvement in her symptoms.  She says she was doing pretty well for couple months until last week when she started having vaginal irritation again.  Patient states that she thinks it is possible that she could have developed some vaginal irritation because she accidentally white down there with a \"IcyHot\" pad shortly before onset of " "recurrent vaginal symptoms.    PAST MEDICAL HISTORY   has a past medical history of Urinary tract infection.    SURGICAL HISTORY   has a past surgical history that includes other (2020).    FAMILY HISTORY  Family History   Problem Relation Age of Onset    Hypertension Mother     Cancer Mother         cervical    Diabetes Father     Hypertension Father     No Known Problems Brother     Alcohol/Drug Maternal Grandfather     Diabetes Paternal Grandmother     No Known Problems Paternal Grandfather        SOCIAL HISTORY  Social History     Tobacco Use    Smoking status: Never    Smokeless tobacco: Never   Vaping Use    Vaping status: Never Used   Substance and Sexual Activity    Alcohol use: Yes    Drug use: No    Sexual activity: Yes     Partners: Male     Birth control/protection: None     Comment: not        CURRENT MEDICATIONS  Home Medications       Reviewed by Marylu Stock (Pharmacy Tech) on 08/26/24 at 1315  Med List Status: Complete     Medication Last Dose Status   Cholecalciferol (D3 PO) 8/25/2024 Active   clindamycin (CLEOCIN) 2 % vaginal cream > 2 nights Active   Ferrous Sulfate (IRON PO) > 3 nights Active   GARLIC PO 8/25/2024 Active   multivitamin Tab 8/25/2024 Active                    ALLERGIES  No Known Allergies    PHYSICAL EXAM  VITAL SIGNS: /79   Pulse 76   Temp 36.2 °C (97.2 °F) (Temporal)   Resp 15   Ht 1.676 m (5' 6\")   Wt 64 kg (141 lb 1.5 oz)   LMP 07/01/2024 (Approximate)   SpO2 94%   BMI 22.77 kg/m²    Constitutional:  Well developed, well nourished; No acute distress   HENT: Normocephalic, Atraumatic, Bilateral external ears normal, oropharyngeal exam deferred due to COVID-19 outbreak and lack of oropharyngeal complaint  Eyes: PERRL, EOMI, Conjunctiva normal, No discharge.   Neck: Normal range of motion, supple, nontender  Lymphatic: No lymphadenopathy noted.   Cardiovascular: Normal heart rate, Normal rhythm, No murmurs, rubs or gallops   Thorax & Lungs: " CTA=bilaterally;  No respiratory distress,  No wheezing rales, or rhonchi; No chest tenderness. No crepitus or subQ air  Abdomen: soft, good bowel sounds, no guarding no rebound, no masses, no pulsatile mass, no tenderness, no distention  Pelvic exam: No obvious lesions, sores or vesicles to the labia minora or majora.  There is small amount of yellow discharge in the vaginal vault.  No cervical motion tenderness mild tenderness bilateral adnexal areas without obvious masses.  Skin: Warm, Dry, No erythema, No rash.   Back: No tenderness, No CVA tenderness.   Extremities: 2+ dp and pt pulses bilateral LEs;  Nontender; no pretibial edema  Neurologic: Alert & oriented x 4, clear speech,   Psychiatric: appropriate, normal affect     EKG/LABS  Results for orders placed or performed during the hospital encounter of 08/26/24   HCG QUALITATIVE UR   Result Value Ref Range    Beta-Hcg Urine Negative Negative   URINALYSIS (UA)    Specimen: Urine   Result Value Ref Range    Color Yellow     Character Clear     Specific Gravity >=1.030 <1.035    Ph 5.5 5.0 - 8.0    Glucose Negative Negative mg/dL    Ketones Negative Negative mg/dL    Protein Negative Negative mg/dL    Bilirubin Negative Negative    Nitrite Negative Negative    Leukocyte Esterase Negative Negative    Occult Blood Negative Negative    Micro Urine Req see below    WET PREP    Specimen: Vaginal; Genital   Result Value Ref Range    Significant Indicator NEG     Source GEN     Site VAGINAL     Wet Prep For Parasites       No yeast.  No motile Trichomonas seen.  No clue cells seen.  Few WBCs seen.     Chlamydia/GC, PCR (Urine)   Result Value Ref Range    Source Urine           COURSE & MEDICAL DECISION MAKING    ASSESSMENT, COURSE AND PLAN  Care Narrative: Patient presents to the ER complaining of recurrent vaginal itching and irritation which started about a week ago.  She has a history of vaginal itching and irritation.  She has been seen by primary care, gynecology,  and dermatology for her vaginal itching and irritation.  She had a biopsy done which was negative for lichen sclerosis.  She was diagnosed with BV couple months ago and was given clindamycin and said things seem to get better after the clindamycin cream and she was fine for a couple months until about a week ago and symptoms started back up again.  She is wondering if perhaps she could have irritated things by accidentally wiping down there with an IcyHot pad.  3 days ago she started having some pain with urination.  No increased frequency urgency of urination.  No cloudy or foul-smelling urine.  No hematuria.  No abnormal vaginal bleeding.  No abdominal pain or back pain.  No fevers or chills.  No nausea or vomiting.  Patient is not pregnant.  Her urine is clear.  Wet mount is negative for yeast, trichomonas and clue cells.  She has a few WBCs.  Patient declines prophylactic antibiotics for chlamydia and gonorrhea.  She would prefer to wait for the results to come back and then follow-up with her primary care physician.  I spoke with the patient about importance of getting back in with her primary care doctor and perhaps gynecology since she continues to have these issues with vaginal irritation and itchiness.  Plan was to discharge the patient, but when I went back in to speak with her about the discharge plan and her results, she had eloped from the room.          ADDITIONAL PROBLEMS MANAGED  Problem #1: Recurrent vaginal itching and irritation x 1 week  Problem #2: Pain with urination x several days    DISPOSITION AND DISCUSSIONS  I have discussed management of the patient with the following physicians and ROCCO's:  none    Discussion of management with other [facility] or appropriate source(s): None     Escalation of care considered, and ultimately not performed: Patient declines prophylactic Rocephin and Zithromax for STD.  Would prefer to wait for the results to come back.    Barriers to care at this time, including  but not limited to:  None .     Decision tools and prescription drugs considered including, but not limited to: Antibiotics no Gardnerella.  No trichomonas.  Patient prefers to defer prophylactic antibiotics at this time therefore no antibiotics given.  Urine is clear.  No evidence of UTI. .    FINAL DIAGNOSIS  1. Acute vaginitis Acute          This dictation has been created using voice recognition software. The accuracy of the dictation is limited by the abilities of the software. I expect there may be some errors of grammar and possibly content. I made every attempt to manually correct the errors within my dictation. However, errors related to voice recognition software may still exist and should be interpreted within the appropriate context.   Electronically signed by: Adriana Cooper M.D., 8/26/2024 12:26 PM

## 2024-08-28 LAB
BACTERIA UR CULT: NORMAL
SIGNIFICANT IND 70042: NORMAL
SITE SITE: NORMAL
SOURCE SOURCE: NORMAL

## 2024-11-25 ENCOUNTER — HOSPITAL ENCOUNTER (EMERGENCY)
Facility: MEDICAL CENTER | Age: 34
End: 2024-11-25
Attending: EMERGENCY MEDICINE
Payer: MEDICAID

## 2024-11-25 VITALS
OXYGEN SATURATION: 97 % | WEIGHT: 148.81 LBS | HEART RATE: 73 BPM | HEIGHT: 66 IN | RESPIRATION RATE: 16 BRPM | TEMPERATURE: 96.8 F | DIASTOLIC BLOOD PRESSURE: 76 MMHG | BODY MASS INDEX: 23.92 KG/M2 | SYSTOLIC BLOOD PRESSURE: 117 MMHG

## 2024-11-25 DIAGNOSIS — N89.8 VAGINAL IRRITATION: ICD-10-CM

## 2024-11-25 DIAGNOSIS — N89.8 VAGINAL DISCHARGE: ICD-10-CM

## 2024-11-25 LAB
APPEARANCE UR: CLEAR
BACTERIA GENITAL QL WET PREP: NORMAL
BILIRUB UR QL STRIP.AUTO: NEGATIVE
C TRACH DNA GENITAL QL NAA+PROBE: NEGATIVE
COLOR UR: YELLOW
GLUCOSE UR STRIP.AUTO-MCNC: NEGATIVE MG/DL
HCG UR QL: NEGATIVE
KETONES UR STRIP.AUTO-MCNC: NEGATIVE MG/DL
LEUKOCYTE ESTERASE UR QL STRIP.AUTO: NEGATIVE
MICRO URNS: NORMAL
N GONORRHOEA DNA GENITAL QL NAA+PROBE: NEGATIVE
NITRITE UR QL STRIP.AUTO: NEGATIVE
PH UR STRIP.AUTO: 6.5 [PH] (ref 5–8)
PROT UR QL STRIP: NEGATIVE MG/DL
RBC UR QL AUTO: NEGATIVE
SIGNIFICANT IND 70042: NORMAL
SITE SITE: NORMAL
SOURCE SOURCE: NORMAL
SP GR UR STRIP.AUTO: >=1.03
SPECIMEN SOURCE: NORMAL

## 2024-11-25 PROCEDURE — 87591 N.GONORRHOEAE DNA AMP PROB: CPT

## 2024-11-25 PROCEDURE — 81003 URINALYSIS AUTO W/O SCOPE: CPT

## 2024-11-25 PROCEDURE — 81025 URINE PREGNANCY TEST: CPT

## 2024-11-25 PROCEDURE — 99284 EMERGENCY DEPT VISIT MOD MDM: CPT

## 2024-11-25 PROCEDURE — 87491 CHLMYD TRACH DNA AMP PROBE: CPT

## 2024-11-25 ASSESSMENT — FIBROSIS 4 INDEX: FIB4 SCORE: 1.19

## 2024-11-25 NOTE — ED TRIAGE NOTES
"Chief Complaint   Patient presents with    Abdominal Pain     Mid lower. States she is unable to get in to see her provider soon enough.   Ongoing a few days and steady in intensity. Reports that her children have \"those nasty stomach bugs, but I'm not having the same symptoms\". Kids had N/V/D.   Denies N/V/D/constipation. Denies fevers, dysuria, or flank pain either.      /76   Pulse 73   Temp 36 °C (96.8 °F) (Temporal)   Resp 16   Ht 1.676 m (5' 6\")   Wt 67.5 kg (148 lb 13 oz)   LMP 11/23/2024 (Exact Date)   SpO2 97%   BMI 24.02 kg/m²     Presents with her infant child.   "

## 2024-11-25 NOTE — ED PROVIDER NOTES
"ED Provider Note    CHIEF COMPLAINT  Chief Complaint   Patient presents with    Abdominal Pain     Mid lower. States she is unable to get in to see her provider soon enough.   Ongoing a few days and steady in intensity. Reports that her children have \"those nasty stomach bugs, but I'm not having the same symptoms\". Kids had N/V/D.   Denies N/V/D/constipation. Denies fevers, dysuria, or flank pain either.        EXTERNAL RECORDS REVIEWED  Outpatient labs & studies 8/26/2024 urinalysis unremarkable pregnancy negative gonorrhea and Chlamydia negative.  Wet prep without yeast, trichomonas or clue cells.  5/10/2024 for vaginal irritation, gonorrhea, chlamydia, Gardnerella, trichomonas negative.    HPI/ROS  LIMITATION TO HISTORY   Select: : None  OUTSIDE HISTORIAN(S):  None    Kalli Jj is a 34 y.o. female who presents to the emergency department through triage for vaginal discharge, discomfort.  Patient describes much less so any lower abdominal discomfort.  But a malodorous thick white vaginal discharge.  States she has had similar previously with \"BV.\"  States is not uncommon for her to get this after intercourse with her monogamous partner.  Not concern for other STD.  Doubts pregnancy, LMP a couple of weeks ago, infant at home.  No fever or chills.  No nausea or vomiting.  Mild urinary discomfort without frequency, urgency, hematuria.  Patient has been followed previously by gynecology for BV and vaginal discomfort, dryness.    PAST MEDICAL HISTORY   has a past medical history of Urinary tract infection.    SURGICAL HISTORY   has a past surgical history that includes other (2020).    FAMILY HISTORY  Family History   Problem Relation Age of Onset    Hypertension Mother     Cancer Mother         cervical    Diabetes Father     Hypertension Father     No Known Problems Brother     Alcohol/Drug Maternal Grandfather     Diabetes Paternal Grandmother     No Known Problems Paternal Grandfather        SOCIAL " "HISTORY  Social History     Tobacco Use    Smoking status: Never    Smokeless tobacco: Never   Vaping Use    Vaping status: Never Used   Substance and Sexual Activity    Alcohol use: Yes     Comment: occasionally    Drug use: No    Sexual activity: Yes     Partners: Male     Birth control/protection: None     Comment: not        CURRENT MEDICATIONS  Home Medications       Reviewed by Marylu Stock (Pharmacy Tech) on 11/25/24 at 0936  Med List Status: Complete     Medication Last Dose Status   Cholecalciferol (D3 PO) 11/24/2024 Active   Ferrous Sulfate (IRON PO) 11/24/2024 Active   GARLIC PO 11/24/2024 Active   multivitamin Tab 11/24/2024 Active   Probiotic Product (PROBIOTIC PO) 11/24/2024 Active                    ALLERGIES  No Known Allergies    PHYSICAL EXAM  VITAL SIGNS: /76   Pulse 73   Temp 36 °C (96.8 °F) (Temporal)   Resp 16   Ht 1.676 m (5' 6\")   Wt 67.5 kg (148 lb 13 oz)   LMP 11/23/2024 (Exact Date)   SpO2 97%   BMI 24.02 kg/m²    Pulse ox interpretation: I interpret this pulse ox as normal.  Constitutional: Alert in no apparent distress.  HENT: Normocephalic, atraumatic. Bilateral external ears normal, Nose normal.   Eyes: Pupils are equal and reactive, Conjunctiva normal.   Neck: Normal range of motion.   Lymphatic: No lymphadenopathy noted.  No inguinal mass or lymphadenopathy  Cardiovascular: Normal peripheral perfusion  Thorax & Lungs: Nonlabored respirations  Abdomen: Soft, non-distended, non-tender to palpation. No palpable or pulsatile masses. No peritoneal signs. No CVA tenderness.  : Refused  Skin: Warm, Dry, No erythema, No rash.   Musculoskeletal: Good range of motion in all major joints.  Neurologic: Aler and orient x 4.  Psychiatric: Affect normal, Judgment normal, Mood normal.       EKG/LABS  Results for orders placed or performed during the hospital encounter of 11/25/24   BETA-HCG QUALITATIVE URINE    Collection Time: 11/25/24  9:20 AM   Result Value Ref " Range    Beta-Hcg Urine Negative Negative   URINALYSIS,CULTURE IF INDICATED    Collection Time: 11/25/24  9:21 AM    Specimen: Urine   Result Value Ref Range    Color Yellow     Character Clear     Specific Gravity >=1.030 <1.035    Ph 6.5 5.0 - 8.0    Glucose Negative Negative mg/dL    Ketones Negative Negative mg/dL    Protein Negative Negative mg/dL    Bilirubin Negative Negative    Nitrite Negative Negative    Leukocyte Esterase Negative Negative    Occult Blood Negative Negative    Micro Urine Req see below    WET PREP    Collection Time: 11/25/24 10:06 AM    Specimen: Vaginal; Genital   Result Value Ref Range    Significant Indicator NEG     Source GEN     Site VAGINAL     Wet Prep For Parasites       No yeast.  No motile Trichomonas seen.  No clue cells seen.  Few WBCs seen.         COURSE & MEDICAL DECISION MAKING    ASSESSMENT, COURSE AND PLAN  Care Narrative:   9:49 AM seen evaluated at bedside.  Clinically well-appearing and nontoxic.  Abdominal exam is benign.  Patient describes some vaginal irritation, dysuria and vaginal discharge.  History of BV with similar symptoms previously.  Refuses pelvic exam, she is aware that without this exam, other infection, abscess, fungating mass, cervical changes or PID cannot be excluded.  She request to for wet prep and GC chlamydia.   self swab not concern for STD otherwise and declines empiric treatment.  Urinalysis pending as well.  No indication at this time for saline lock, labs as ordered per protocol.    10:35 AM Urinalysis unremarkable.  Wet prep negative for trichomonas, clue cells, yeast.  Awaiting pregnancy.    11:11 AM pregnancy negative.  Patient reevaluated at bedside.  With negative results and unknown source of her reported malodorous discharge again a pelvic exam is recommended.  She declines.  She is aware she will be notified if GC or chlamydia are positive and require treatment.  Otherwise she plans to follow-up with OB/GYN, Dr. Howe's office for  "exam and further workup if symptoms persist.    ADDITIONAL PROBLEMS MANAGED  Postpartum    DISPOSITION AND DISCUSSIONS    Discussion of management with other [facility] or appropriate source(s): None     Escalation of care considered, and ultimately not performed:after discussion with the patient / family, they have elected to decline an escalation in care patient continues to decline pelvic exam \"I have had so many of them,\" and will follow up with OB/GYN.    Barriers to care at this time, including but not limited to:  None .     Decision tools and prescription drugs considered including, but not limited to: Antibiotics no indication at this time with results above.  Should be notified if gonorrhea/chlamydia results indicate further treatment .    The patient is stable for discharge home, anticipatory guidance provided, close follow-up is encouraged and strict return instructions have been discussed. Patient is agreeable to the disposition and plan.      FINAL DIAGNOSIS  1. Vaginal discharge    2. Vaginal irritation         Electronically signed by: Maty Castaneda D.O., 11/25/2024 9:45 AM      "

## 2024-11-25 NOTE — ED NOTES
Abd pain protocol orders placed. At this time pt declined PIV or blood work, stating she is a difficult stick. She would not be persuaded otherwise.    LVM for pharmacy to discontinue the Keflex.

## 2024-11-25 NOTE — DISCHARGE INSTRUCTIONS
Follow-up with gynecology as soon as possible for reevaluation.    Return to the emergency department for persistent or worsening vaginal irritation, discharge, abdominal pain, flank pain, fever, vomiting or other new concerns.

## 2025-02-07 ENCOUNTER — HOSPITAL ENCOUNTER (EMERGENCY)
Facility: MEDICAL CENTER | Age: 35
End: 2025-02-07
Attending: EMERGENCY MEDICINE
Payer: MEDICAID

## 2025-02-07 VITALS
BODY MASS INDEX: 22.85 KG/M2 | DIASTOLIC BLOOD PRESSURE: 84 MMHG | RESPIRATION RATE: 16 BRPM | TEMPERATURE: 98.4 F | OXYGEN SATURATION: 97 % | SYSTOLIC BLOOD PRESSURE: 112 MMHG | HEART RATE: 75 BPM | WEIGHT: 142.2 LBS | HEIGHT: 66 IN

## 2025-02-07 DIAGNOSIS — N39.0 ACUTE UTI: ICD-10-CM

## 2025-02-07 LAB
APPEARANCE UR: ABNORMAL
BACTERIA #/AREA URNS HPF: ABNORMAL /HPF
BILIRUB UR QL STRIP.AUTO: ABNORMAL
CASTS URNS QL MICRO: 0 /LPF (ref 0–2)
COLOR UR: ABNORMAL
EPITHELIAL CELLS 1715: ABNORMAL /HPF (ref 0–5)
GLUCOSE UR STRIP.AUTO-MCNC: ABNORMAL MG/DL
KETONES UR STRIP.AUTO-MCNC: ABNORMAL MG/DL
LEUKOCYTE ESTERASE UR QL STRIP.AUTO: ABNORMAL
MICRO URNS: ABNORMAL
MUCOUS THREADS URNS QL MICRO: PRESENT /HPF
NITRITE UR QL STRIP.AUTO: ABNORMAL
PH UR STRIP.AUTO: 6.5 [PH] (ref 5–8)
PROT UR QL STRIP: ABNORMAL MG/DL
RBC # URNS HPF: ABNORMAL /HPF
RBC UR QL AUTO: ABNORMAL
SP GR UR STRIP.AUTO: 1.02
UROBILINOGEN UR STRIP.AUTO-MCNC: ABNORMAL EU/DL
WBC #/AREA URNS HPF: ABNORMAL /HPF

## 2025-02-07 PROCEDURE — 81001 URINALYSIS AUTO W/SCOPE: CPT

## 2025-02-07 PROCEDURE — A9270 NON-COVERED ITEM OR SERVICE: HCPCS | Performed by: EMERGENCY MEDICINE

## 2025-02-07 PROCEDURE — 700102 HCHG RX REV CODE 250 W/ 637 OVERRIDE(OP): Performed by: EMERGENCY MEDICINE

## 2025-02-07 PROCEDURE — 99283 EMERGENCY DEPT VISIT LOW MDM: CPT

## 2025-02-07 RX ORDER — NITROFURANTOIN 25; 75 MG/1; MG/1
100 CAPSULE ORAL ONCE
Status: COMPLETED | OUTPATIENT
Start: 2025-02-07 | End: 2025-02-07

## 2025-02-07 RX ORDER — NITROFURANTOIN 25; 75 MG/1; MG/1
100 CAPSULE ORAL 2 TIMES DAILY
Qty: 14 CAPSULE | Refills: 0 | Status: ACTIVE | OUTPATIENT
Start: 2025-02-07 | End: 2025-02-14

## 2025-02-07 RX ADMIN — NITROFURANTOIN MONOHYDRATE/MACROCRYSTALS 100 MG: 75; 25 CAPSULE ORAL at 13:36

## 2025-02-07 ASSESSMENT — FIBROSIS 4 INDEX: FIB4 SCORE: 1.22686932202795475

## 2025-02-07 NOTE — ED PROVIDER NOTES
"ED PHYSICIAN NOTE    CHIEF COMPLAINT  Chief Complaint   Patient presents with    UTI     Started having urinary issues a couple of days ago    burning and discomfort with voiding   no fevers        EXTERNAL RECORDS REVIEWED  STI testing was negative in November 2024    HPI/ROS      Kalli Jj is a 35 y.o. female who presents with dysuria.  Started about 2 or 3 days ago.  Burning sensation after urinating.  No abnormal vaginal discharge or bleeding.  Denies fevers, nausea vomiting back pain.  This feels similar to prior urinary tract infection    PAST MEDICAL HISTORY  Past Medical History:   Diagnosis Date    Urinary tract infection        SOCIAL HISTORY  Social History     Tobacco Use    Smoking status: Never    Smokeless tobacco: Never   Vaping Use    Vaping status: Never Used   Substance Use Topics    Alcohol use: Yes     Comment: occasionally    Drug use: No       CURRENT MEDICATIONS  Home Medications       Reviewed by Dior Lopez R.N. (Registered Nurse) on 02/07/25 at 1242  Med List Status: Partial     Medication Last Dose Status   Cholecalciferol (D3 PO)  Active   Ferrous Sulfate (IRON PO)  Active   GARLIC PO  Active   multivitamin Tab  Active   Probiotic Product (PROBIOTIC PO)  Active                    ALLERGIES  No Known Allergies    PHYSICAL EXAM  VITAL SIGNS: /84   Pulse 75   Temp 36.9 °C (98.4 °F) (Temporal)   Resp 16   Ht 1.676 m (5' 6\")   Wt 64.5 kg (142 lb 3.2 oz)   LMP 02/05/2025 (Exact Date)   SpO2 97%   BMI 22.95 kg/m²    Constitutional: Awake and alert  HENT: Normal inspection  Eyes: Normal inspection  Cardiovascular: Normal heart rate  Thorax & Lungs: No respiratory distress  Extremities: Well perfused  Neurologic: Grossly normal   Psychiatric: Normal for situation      DIAGNOSTIC STUDIES / PROCEDURES  LABS/EKG  Results for orders placed or performed during the hospital encounter of 02/07/25   URINALYSIS    Collection Time: 02/07/25 12:40 PM    Specimen: Urine, " Clean Catch   Result Value Ref Range    Color Orange (A)     Character Hazy (A)     Specific Gravity 1.020 <1.035    Ph 6.5 5.0 - 8.0    Glucose See Comment Negative mg/dL    Ketones See Comment Negative mg/dL    Protein See Comment Negative mg/dL    Bilirubin See Comment Negative    Urobilinogen, Urine See Comment <=1.0 EU/dL    Nitrite See Comment Negative    Leukocyte Esterase Moderate (A) Negative    Occult Blood Large (A) Negative    Micro Urine Req Microscopic    URINE MICROSCOPIC (W/UA)    Collection Time: 02/07/25 12:40 PM   Result Value Ref Range    WBC  (A) /hpf    RBC 6-10 (A) /hpf    Bacteria Many (A) None /hpf    Epithelial Cells 6-10 (A) 0 - 5 /hpf    Mucous Threads Present /hpf    Urine Casts 0 0 - 2 /lpf          COURSE & MEDICAL DECISION MAKING    INITIAL ASSESSMENT, COURSE AND PLAN  Case narrative: Patient presents with dysuria.  She is afebrile.  No flank pain.  No nausea vomiting fever.  UA was sent and is consistent with UTI.  She has no signs of upper tract infection.  She will be treated with Macrobid.  Given first dose in the ER and a prescription was sent to the pharmacy.  Standard instructions for UTI were given.  Patient should return to the ER for fevers, vomiting cannot keep down medications or if she has concern.  She voiced understanding.          Interventions  Medications   nitrofurantoin (Macrobid) capsule 100 mg (100 mg Oral Given 2/7/25 1336)         DISPOSITION AND DISCUSSIONS    Escalation of care considered, and ultimately not performed: Consider blood work but no systemic symptoms or findings on examination.  No evidence of dehydration to warrant IV fluids.    Prescription drugs considered and/or prescribed:     Prescribed   Discharge Medication List as of 2/7/2025  1:38 PM        START taking these medications    Details   nitrofurantoin (MACROBID) 100 MG Cap Take 1 Capsule by mouth 2 times a day for 7 days., Disp-14 Capsule, R-0, Normal           Follow up  Stephanie DUBON  ROSALBA Ovalles.  [address] [place] [id number]  [phone]          FINAL IMPRESSION  1.  Cystitis with hematuria    This dictation was created using voice recognition software. The accuracy of the dictation is limited to the abilities of the software. I expect there may be some errors of grammar and possibly content. The nursing notes were reviewed and certain aspects of this information were incorporated into this note.    Electronically signed by: Joshua Crockett M.D., 2/7/2025

## 2025-02-07 NOTE — ED NOTES
Discharge instructions provided. Patient verbalized understanding of discharge instructions to follow up with primary care physician and to return to the ER if condition worsens. Patient ambulated out of ER without difficulty.

## 2025-03-20 ENCOUNTER — OFFICE VISIT (OUTPATIENT)
Dept: MEDICAL GROUP | Facility: MEDICAL CENTER | Age: 35
End: 2025-03-20
Attending: INTERNAL MEDICINE
Payer: MEDICAID

## 2025-03-20 VITALS
DIASTOLIC BLOOD PRESSURE: 60 MMHG | BODY MASS INDEX: 21.86 KG/M2 | HEIGHT: 66 IN | RESPIRATION RATE: 16 BRPM | HEART RATE: 90 BPM | TEMPERATURE: 98 F | WEIGHT: 136 LBS | OXYGEN SATURATION: 96 % | SYSTOLIC BLOOD PRESSURE: 90 MMHG

## 2025-03-20 DIAGNOSIS — N76.0 BACTERIAL VAGINOSIS: ICD-10-CM

## 2025-03-20 DIAGNOSIS — N89.8 VAGINAL IRRITATION: ICD-10-CM

## 2025-03-20 DIAGNOSIS — N89.8 VAGINAL DISCHARGE: ICD-10-CM

## 2025-03-20 DIAGNOSIS — B96.89 BACTERIAL VAGINOSIS: ICD-10-CM

## 2025-03-20 PROBLEM — L72.3 SEBACEOUS CYST: Status: RESOLVED | Noted: 2024-04-05 | Resolved: 2025-03-20

## 2025-03-20 PROBLEM — H60.92 OTITIS EXTERNA OF LEFT EAR: Status: RESOLVED | Noted: 2024-04-05 | Resolved: 2025-03-20

## 2025-03-20 PROCEDURE — 3078F DIAST BP <80 MM HG: CPT | Performed by: INTERNAL MEDICINE

## 2025-03-20 PROCEDURE — 99212 OFFICE O/P EST SF 10 MIN: CPT | Performed by: INTERNAL MEDICINE

## 2025-03-20 PROCEDURE — 99214 OFFICE O/P EST MOD 30 MIN: CPT | Performed by: INTERNAL MEDICINE

## 2025-03-20 PROCEDURE — 3074F SYST BP LT 130 MM HG: CPT | Performed by: INTERNAL MEDICINE

## 2025-03-20 PROCEDURE — RXMED WILLOW AMBULATORY MEDICATION CHARGE: Performed by: INTERNAL MEDICINE

## 2025-03-20 RX ORDER — METRONIDAZOLE 7.5 MG/G
1 GEL VAGINAL
Qty: 70 G | Refills: 0 | Status: SHIPPED | OUTPATIENT
Start: 2025-03-20 | End: 2025-03-26 | Stop reason: SDUPTHER

## 2025-03-20 ASSESSMENT — PATIENT HEALTH QUESTIONNAIRE - PHQ9: CLINICAL INTERPRETATION OF PHQ2 SCORE: 0

## 2025-03-20 ASSESSMENT — FIBROSIS 4 INDEX: FIB4 SCORE: 1.22686932202795475

## 2025-03-20 NOTE — ASSESSMENT & PLAN NOTE
She continues to report intermittent vaginal discharge as well as some mild vaginal discomfort which has been a problem for her for many years.  Interestingly, it seemed to rufino completely during her last pregnancy but then came back a few months after her delivery.  She does not always have the discharge but sometimes it is present and mild.  She mostly just does not quite feel normal in the vaginal area.  She denies pelvic pain.  She has had many vaginal pathogen tests done which have been negative but she recently sent away for a extensive vaginal pathogen panel which she ordered online.  She states that she just got the results back and she was told that she has bacterial vaginosis, possibly CV, and that she should treat it with MetroGel as well as vaginal probiotics and vaginal vitamin C.  She was not given prescriptions for any of these.  Unfortunately, she cannot access her results during this visit.  She has seen several local gynecologists without much progress in this regard.  However, she is requesting a new referral.  She has also seen dermatology but has not had lichen sclerosus been prescribed any medication.  Her gynecologist did a biopsy to rule this out which was negative.

## 2025-03-20 NOTE — ASSESSMENT & PLAN NOTE
On and off mild discharge, odor, no pain or itching, sometimes swelling, felt normal with last pregnancy, started again a few months post partum.  Worst right before and right after perod.

## 2025-03-20 NOTE — PROGRESS NOTES
Subjective:   Kalli jJ is a 35 y.o. female here today for persistent vaginal symptoms    Intermittent vaginal discharge  She continues to report intermittent vaginal discharge as well as some mild vaginal discomfort which has been a problem for her for many years.  Interestingly, it seemed to rufino completely during her last pregnancy but then came back a few months after her delivery.  She does not always have the discharge but sometimes it is present and mild.  She mostly just does not quite feel normal in the vaginal area.  She denies pelvic pain.  She has had many vaginal pathogen tests done which have been negative but she recently sent away for a extensive vaginal pathogen panel which she ordered online.  She states that she just got the results back and she was told that she has bacterial vaginosis, possibly CV, and that she should treat it with MetroGel as well as vaginal probiotics and vaginal vitamin C.  She was not given prescriptions for any of these.  Unfortunately, she cannot access her results during this visit.  She has seen several local gynecologists without much progress in this regard.  However, she is requesting a new referral.  She has also seen dermatology but has not had lichen sclerosus been prescribed any medication.  Her gynecologist did a biopsy to rule this out which was negative.       Current medicines (including changes today)  Current Outpatient Medications   Medication Sig Dispense Refill    metroNIDAZOLE (METROGEL-VAGINAL) 0.75 % Gel Insert 1 Applicator into the vagina at bedtime for 5 days. 70 g 0     No current facility-administered medications for this visit.     She  has a past medical history of Urinary tract infection.         Objective:     Vitals:    03/20/25 0953   BP: 90/60   Pulse: 90   Resp: 16   Temp: 36.7 °C (98 °F)   SpO2: 96%     Body mass index is 21.95 kg/m².   Physical Exam:  Constitutional: Alert, no distress.  Skin: Warm, dry, good turgor, no  rashes in visible areas.  Eye: Equal, round and reactive, conjunctiva clear, lids normal.  Psych: Alert and oriented x3, normal affect and mood.      Assessment and Plan:   The following treatment plan was discussed    1. Vaginal irritation  2. Intermittent vaginal discharge  Patient requesting a referral to a new gynecologist which I have placed.  She will also send me the report from her vaginal testing for review.  - Referral to Gynecology  - Referral to Gynecology    3. Bacterial vaginosis  Will treat based on patient report.  - metroNIDAZOLE (METROGEL-VAGINAL) 0.75 % Gel; Insert 1 Applicator into the vagina at bedtime for 5 days.  Dispense: 70 g; Refill: 0        Followup: Return if symptoms worsen or fail to improve.

## 2025-03-24 NOTE — Clinical Note
REFERRAL APPROVAL NOTICE         Sent on March 24, 2025                   Kalli Jj  [address]  [place] [address]                   Dear Ms. Jj,    After a careful review of the medical information and benefit coverage, [facility] has processed your referral. See below for additional details.    If applicable, you must be actively enrolled with your insurance for coverage of the authorized service. If you have any questions regarding your coverage, please contact your insurance directly.    REFERRAL INFORMATION   Referral #:  [id number]  Referred-To Department    Referred-By Provider:  Gynecology    Stephanei Ovalles M.D.   [facility]      [address]  [place] [address]  [phone] [address]  [place] [id number] [phone]    Referral Start Date:  03/20/2025  Referral End Date:   03/20/2026             SCHEDULING  If you do not already have an appointment, please call [phone] to make an appointment.     MORE INFORMATION  If you do not already have a [employer] account, sign up at: [email or web address]  You can access your medical information, make appointments, see lab results, billing information, and more.  If you have questions regarding this referral, please contact  the [facility] Referrals department at:             [phone]. Monday - Friday 8:00AM - 5:00PM.     Sincerely,    [facility]

## 2025-03-26 ENCOUNTER — PATIENT MESSAGE (OUTPATIENT)
Dept: MEDICAL GROUP | Facility: MEDICAL CENTER | Age: 35
End: 2025-03-26
Payer: MEDICAID

## 2025-03-26 ENCOUNTER — PHARMACY VISIT (OUTPATIENT)
Dept: PHARMACY | Facility: MEDICAL CENTER | Age: 35
End: 2025-03-26
Payer: COMMERCIAL

## 2025-03-26 DIAGNOSIS — N76.0 BACTERIAL VAGINOSIS: ICD-10-CM

## 2025-03-26 DIAGNOSIS — B96.89 BACTERIAL VAGINOSIS: ICD-10-CM

## 2025-03-26 RX ORDER — METRONIDAZOLE 7.5 MG/G
1 GEL VAGINAL
Qty: 70 G | Refills: 0 | Status: SHIPPED | OUTPATIENT
Start: 2025-03-26 | End: 2025-03-31

## 2025-06-10 ENCOUNTER — APPOINTMENT (OUTPATIENT)
Dept: RADIOLOGY | Facility: MEDICAL CENTER | Age: 35
End: 2025-06-10
Attending: EMERGENCY MEDICINE
Payer: MEDICAID

## 2025-06-10 ENCOUNTER — HOSPITAL ENCOUNTER (EMERGENCY)
Facility: MEDICAL CENTER | Age: 35
End: 2025-06-10
Attending: EMERGENCY MEDICINE
Payer: MEDICAID

## 2025-06-10 VITALS
WEIGHT: 136.47 LBS | DIASTOLIC BLOOD PRESSURE: 79 MMHG | HEART RATE: 77 BPM | BODY MASS INDEX: 21.93 KG/M2 | OXYGEN SATURATION: 99 % | HEIGHT: 66 IN | SYSTOLIC BLOOD PRESSURE: 113 MMHG | RESPIRATION RATE: 16 BRPM | TEMPERATURE: 97.7 F

## 2025-06-10 DIAGNOSIS — O20.9 VAGINAL BLEEDING IN PREGNANCY, FIRST TRIMESTER: Primary | ICD-10-CM

## 2025-06-10 LAB
ALBUMIN SERPL BCP-MCNC: 4.6 G/DL (ref 3.2–4.9)
ALBUMIN/GLOB SERPL: 1.6 G/DL
ALP SERPL-CCNC: 39 U/L (ref 30–99)
ALT SERPL-CCNC: 10 U/L (ref 2–50)
ANION GAP SERPL CALC-SCNC: 12 MMOL/L (ref 7–16)
APPEARANCE UR: CLEAR
AST SERPL-CCNC: 17 U/L (ref 12–45)
B-HCG SERPL-ACNC: 1496 MIU/ML (ref 0–10)
BACTERIA #/AREA URNS HPF: ABNORMAL /HPF
BASOPHILS # BLD AUTO: 0 % (ref 0–1.8)
BASOPHILS # BLD: 0 K/UL (ref 0–0.12)
BILIRUB SERPL-MCNC: 0.5 MG/DL (ref 0.1–1.5)
BILIRUB UR QL STRIP.AUTO: NEGATIVE
BUN SERPL-MCNC: 7 MG/DL (ref 8–22)
CALCIUM ALBUM COR SERPL-MCNC: 8.8 MG/DL (ref 8.5–10.5)
CALCIUM SERPL-MCNC: 9.3 MG/DL (ref 8.5–10.5)
CASTS URNS QL MICRO: ABNORMAL /LPF (ref 0–2)
CHLORIDE SERPL-SCNC: 103 MMOL/L (ref 96–112)
CO2 SERPL-SCNC: 20 MMOL/L (ref 20–33)
COLOR UR: YELLOW
CREAT SERPL-MCNC: 0.75 MG/DL (ref 0.5–1.4)
EOSINOPHIL # BLD AUTO: 0.01 K/UL (ref 0–0.51)
EOSINOPHIL NFR BLD: 0.2 % (ref 0–6.9)
EPITHELIAL CELLS 1715: ABNORMAL /HPF (ref 0–5)
ERYTHROCYTE [DISTWIDTH] IN BLOOD BY AUTOMATED COUNT: 45.1 FL (ref 35.9–50)
GFR SERPLBLD CREATININE-BSD FMLA CKD-EPI: 106 ML/MIN/1.73 M 2
GLOBULIN SER CALC-MCNC: 2.9 G/DL (ref 1.9–3.5)
GLUCOSE SERPL-MCNC: 85 MG/DL (ref 65–99)
GLUCOSE UR STRIP.AUTO-MCNC: NEGATIVE MG/DL
HCT VFR BLD AUTO: 41.8 % (ref 37–47)
HGB BLD-MCNC: 13.6 G/DL (ref 12–16)
IMM GRANULOCYTES # BLD AUTO: 0.01 K/UL (ref 0–0.11)
IMM GRANULOCYTES NFR BLD AUTO: 0.2 % (ref 0–0.9)
KETONES UR STRIP.AUTO-MCNC: 40 MG/DL
LEUKOCYTE ESTERASE UR QL STRIP.AUTO: NEGATIVE
LIPASE SERPL-CCNC: 29 U/L (ref 11–82)
LYMPHOCYTES # BLD AUTO: 1.44 K/UL (ref 1–4.8)
LYMPHOCYTES NFR BLD: 26.8 % (ref 22–41)
MCH RBC QN AUTO: 27.8 PG (ref 27–33)
MCHC RBC AUTO-ENTMCNC: 32.5 G/DL (ref 32.2–35.5)
MCV RBC AUTO: 85.3 FL (ref 81.4–97.8)
MICRO URNS: ABNORMAL
MONOCYTES # BLD AUTO: 0.33 K/UL (ref 0–0.85)
MONOCYTES NFR BLD AUTO: 6.1 % (ref 0–13.4)
NEUTROPHILS # BLD AUTO: 3.59 K/UL (ref 1.82–7.42)
NEUTROPHILS NFR BLD: 66.7 % (ref 44–72)
NITRITE UR QL STRIP.AUTO: NEGATIVE
NRBC # BLD AUTO: 0 K/UL
NRBC BLD-RTO: 0 /100 WBC (ref 0–0.2)
NUMBER OF RH DOSES IND 8505RD: NORMAL
PH UR STRIP.AUTO: 6.5 [PH] (ref 5–8)
PLATELET # BLD AUTO: 180 K/UL (ref 164–446)
PMV BLD AUTO: 10.8 FL (ref 9–12.9)
POTASSIUM SERPL-SCNC: 3.7 MMOL/L (ref 3.6–5.5)
PROT SERPL-MCNC: 7.5 G/DL (ref 6–8.2)
PROT UR QL STRIP: NEGATIVE MG/DL
RBC # BLD AUTO: 4.9 M/UL (ref 4.2–5.4)
RBC # URNS HPF: ABNORMAL /HPF
RBC UR QL AUTO: ABNORMAL
RH BLD: NORMAL
SODIUM SERPL-SCNC: 135 MMOL/L (ref 135–145)
SP GR UR STRIP.AUTO: 1.02
T PALLIDUM AB SER QL IA: NORMAL
UROBILINOGEN UR STRIP.AUTO-MCNC: 1 EU/DL
WBC # BLD AUTO: 5.4 K/UL (ref 4.8–10.8)
WBC #/AREA URNS HPF: ABNORMAL /HPF

## 2025-06-10 PROCEDURE — 84702 CHORIONIC GONADOTROPIN TEST: CPT

## 2025-06-10 PROCEDURE — 86901 BLOOD TYPING SEROLOGIC RH(D): CPT

## 2025-06-10 PROCEDURE — 83690 ASSAY OF LIPASE: CPT

## 2025-06-10 PROCEDURE — 80053 COMPREHEN METABOLIC PANEL: CPT

## 2025-06-10 PROCEDURE — 76817 TRANSVAGINAL US OBSTETRIC: CPT

## 2025-06-10 PROCEDURE — 81001 URINALYSIS AUTO W/SCOPE: CPT

## 2025-06-10 PROCEDURE — 86780 TREPONEMA PALLIDUM: CPT

## 2025-06-10 PROCEDURE — 85025 COMPLETE CBC W/AUTO DIFF WBC: CPT

## 2025-06-10 PROCEDURE — 99284 EMERGENCY DEPT VISIT MOD MDM: CPT

## 2025-06-10 PROCEDURE — 36415 COLL VENOUS BLD VENIPUNCTURE: CPT

## 2025-06-10 NOTE — ED NOTES
Gold top drawn and sent to lab. Unable to obtain rest of labs. Pt did not want to continue with blood work after first draw was unsuccessful. Pt educated on importance of lab work for POC but is currently declining. Pt states if she changes her mind she will let staff know

## 2025-06-10 NOTE — DISCHARGE INSTRUCTIONS
You need to have a repeat blood draw to check your pregnancy hormone level in 2 days on June 12.  An order has been placed and you need to to one of our labs or you can have this drawn.  A referral has been placed to follow-up with gynecology and you will need to schedule a follow-up appointment for this.  Seek medical attention for any concerning abdominal pain severe bleeding or other concerns

## 2025-06-10 NOTE — ED NOTES
Pt left ED rm 10 prior to receiving her dischg instruction   not found in WR or JERAMY BELL aware

## 2025-06-10 NOTE — ED TRIAGE NOTES
"Patient presents to the ER with the following complaints:    Chief Complaint   Patient presents with    Vaginal Bleeding     Found out she was pregnant a week ago. Has been having spotting and abdominal cramping since.        /78   Pulse 63   Temp 36.2 °C (97.1 °F) (Temporal)   Resp 18   Ht 1.676 m (5' 6\")   Wt 61.9 kg (136 lb 7.4 oz)   LMP 05/01/2025 (Approximate)   SpO2 98%   BMI 22.03 kg/m²       "

## 2025-06-16 NOTE — Clinical Note
REFERRAL APPROVAL NOTICE         Sent on June 16, 2025                   Kalli Jj  [address]  [place] [address]                   Dear Ms. Jj,    After a careful review of the medical information and benefit coverage, [facility] has processed your referral. See below for additional details.    If applicable, you must be actively enrolled with your insurance for coverage of the authorized service. If you have any questions regarding your coverage, please contact your insurance directly.    REFERRAL INFORMATION   Referral #:  [id number]  Referred-To Department    Referred-By Provider:  OB/Gyn    Lucio Singh M.D.   [address]  [address]  [place] [address]  [phone] [address]  [place] [id number] [phone]    Referral Start Date:  06/10/2025  Referral End Date:   06/10/2026             SCHEDULING  If you do not already have an appointment, please call [phone] to make an appointment.     MORE INFORMATION  If you do not already have a [employer] account, sign up at: [email or web address]  You can access your medical information, make appointments, see lab results, billing information, and more.  If you have questions regarding this referral, please contact  the [facility] Referrals department at:             [phone]. Monday - Friday 8:00AM - 5:00PM.     Sincerely,    [facility]